# Patient Record
Sex: MALE | Race: WHITE | NOT HISPANIC OR LATINO
[De-identification: names, ages, dates, MRNs, and addresses within clinical notes are randomized per-mention and may not be internally consistent; named-entity substitution may affect disease eponyms.]

---

## 2019-02-26 ENCOUNTER — RX RENEWAL (OUTPATIENT)
Age: 59
End: 2019-02-26

## 2019-04-08 DIAGNOSIS — Z80.8 FAMILY HISTORY OF MALIGNANT NEOPLASM OF OTHER ORGANS OR SYSTEMS: ICD-10-CM

## 2019-04-08 DIAGNOSIS — Z83.438 FAMILY HISTORY OF OTHER DISORDER OF LIPOPROTEIN METABOLISM AND OTHER LIPIDEMIA: ICD-10-CM

## 2019-04-15 ENCOUNTER — RX RENEWAL (OUTPATIENT)
Age: 59
End: 2019-04-15

## 2019-04-19 ENCOUNTER — RX RENEWAL (OUTPATIENT)
Age: 59
End: 2019-04-19

## 2019-04-22 ENCOUNTER — RX RENEWAL (OUTPATIENT)
Age: 59
End: 2019-04-22

## 2019-04-24 ENCOUNTER — RX RENEWAL (OUTPATIENT)
Age: 59
End: 2019-04-24

## 2019-04-29 ENCOUNTER — APPOINTMENT (OUTPATIENT)
Dept: INTERNAL MEDICINE | Facility: CLINIC | Age: 59
End: 2019-04-29
Payer: COMMERCIAL

## 2019-04-29 VITALS
OXYGEN SATURATION: 98 % | WEIGHT: 263 LBS | SYSTOLIC BLOOD PRESSURE: 140 MMHG | HEART RATE: 64 BPM | RESPIRATION RATE: 16 BRPM | DIASTOLIC BLOOD PRESSURE: 88 MMHG | BODY MASS INDEX: 31.05 KG/M2 | TEMPERATURE: 98.4 F | HEIGHT: 77 IN

## 2019-04-29 DIAGNOSIS — Z84.1 FAMILY HISTORY OF DISORDERS OF KIDNEY AND URETER: ICD-10-CM

## 2019-04-29 DIAGNOSIS — Z78.9 OTHER SPECIFIED HEALTH STATUS: ICD-10-CM

## 2019-04-29 DIAGNOSIS — Z87.891 PERSONAL HISTORY OF NICOTINE DEPENDENCE: ICD-10-CM

## 2019-04-29 LAB
BILIRUB UR QL STRIP: NEGATIVE
CLARITY UR: CLEAR
COLLECTION METHOD: NORMAL
GLUCOSE UR-MCNC: 500
HCG UR QL: 0.2 EU/DL
HGB UR QL STRIP.AUTO: NEGATIVE
KETONES UR-MCNC: NEGATIVE
LEUKOCYTE ESTERASE UR QL STRIP: NEGATIVE
NITRITE UR QL STRIP: NEGATIVE
PH UR STRIP: 5.5
PROT UR STRIP-MCNC: NEGATIVE
SP GR UR STRIP: 1.02

## 2019-04-29 PROCEDURE — 81003 URINALYSIS AUTO W/O SCOPE: CPT | Mod: QW

## 2019-04-29 PROCEDURE — 36415 COLL VENOUS BLD VENIPUNCTURE: CPT

## 2019-04-29 PROCEDURE — 99396 PREV VISIT EST AGE 40-64: CPT | Mod: 25

## 2019-04-29 PROCEDURE — 86580 TB INTRADERMAL TEST: CPT

## 2019-04-29 NOTE — DATA REVIEWED
[FreeTextEntry1] : Patient refused EKG, aorta skin, pulmonary function tests, and vaso scan \par Refused Hep c and HIV testing.  \par Refused rectal and genital exam

## 2019-04-29 NOTE — PHYSICAL EXAM
[No Acute Distress] : no acute distress [Well Nourished] : well nourished [Well Developed] : well developed [Well-Appearing] : well-appearing [Normal Sclera/Conjunctiva] : normal sclera/conjunctiva [PERRL] : pupils equal round and reactive to light [EOMI] : extraocular movements intact [Normal Outer Ear/Nose] : the outer ears and nose were normal in appearance [Normal Oropharynx] : the oropharynx was normal [Normal TMs] : both tympanic membranes were normal [Normal Nasal Mucosa] : the nasal mucosa was normal [No JVD] : no jugular venous distention [Supple] : supple [No Lymphadenopathy] : no lymphadenopathy [Thyroid Normal, No Nodules] : the thyroid was normal and there were no nodules present [No Respiratory Distress] : no respiratory distress  [Clear to Auscultation] : lungs were clear to auscultation bilaterally [No Accessory Muscle Use] : no accessory muscle use [Normal Percussion] : the chest was normal to percussion [Normal Rate] : normal rate  [Regular Rhythm] : with a regular rhythm [Normal S1, S2] : normal S1 and S2 [No Carotid Bruits] : no carotid bruits [No Abdominal Bruit] : a ~M bruit was not heard ~T in the abdomen [No Varicosities] : no varicosities [Pedal Pulses Present] : the pedal pulses are present [No Edema] : there was no peripheral edema [No Extremity Clubbing/Cyanosis] : no extremity clubbing/cyanosis [No Palpable Aorta] : no palpable aorta [Normal Appearance] : normal in appearance [Soft] : abdomen soft [Non Tender] : non-tender [Non-distended] : non-distended [No Masses] : no abdominal mass palpated [No HSM] : no HSM [Normal Bowel Sounds] : normal bowel sounds [Obese] : obese [Declined Rectal Exam] : declined rectal exam [Normal Posterior Cervical Nodes] : no posterior cervical lymphadenopathy [Normal Anterior Cervical Nodes] : no anterior cervical lymphadenopathy [No CVA Tenderness] : no CVA  tenderness [No Spinal Tenderness] : no spinal tenderness [No Joint Swelling] : no joint swelling [Grossly Normal Strength/Tone] : grossly normal strength/tone [No Rash] : no rash [Male Pattern Baldness] : male pattern baldness [Normal Gait] : normal gait [Coordination Grossly Intact] : coordination grossly intact [No Focal Deficits] : no focal deficits [Deep Tendon Reflexes (DTR)] : deep tendon reflexes were 2+ and symmetric [Speech Grossly Normal] : speech grossly normal [Normal Affect] : the affect was normal [Normal Mood] : the mood was normal [Normal Insight/Judgement] : insight and judgment were intact [None] : no ulcers in either foot were found [] : left foot [___] : right foot points [unfilled] [de-identified] : overweight  [de-identified] : declined PFT  [de-identified] : with murmur, Declined EKG  [de-identified] : declined exam  [de-identified] :  Discoloration of the lower extremity,  purplish discoloration of bilateral toes. Had abrasion at the tip of the first toe with callus. Right foot callus and fissure proximal to the fourth and fifth digit [de-identified] : cool to touch with purplish discoloration  [de-identified] :   rubar discoloration of bilateral toes. Had abrasion at the tip of the first toe with callus. Right foot callus and fissure proximal to the fourth and fifth digit

## 2019-04-29 NOTE — PLAN
[FreeTextEntry1] : Cardiology-\par Patient with metabolic syndrome. Refused EKG today. Advised the importance of checking for underlying cardiovascular disease. Advised patient to followup cardiology. Check labs today.\par \par Pulmonology\par Ex-smoker. Advised patient the importance of checking his pulmonary function test to rule out underlying pulmonary disease. Patient refused.\par Refused aorta skin to rule out underlying abdominal aortic aneurysm secondary to his history of smoking.\par Education was provided.\par \par Gastroenterology/urology.\par Patient refused genital and rectal exam. Advised importance to screen for colon, and prostate cancer. Advised patient to follow gastroenterologist and urologist.\par \par Endocrinology.\par Uncontrolled diabetes. New signs of diabetic neuropathy. Otherwise patient importance of following up with a podiatrist for complete evaluation. As well as to address the 2 foot lesions. Also advised patient importance followup with ophthalmology will see her to rule out diabetic retinopathy. Check A1c level today. Continue with current medications.\par \par Immunization.\par Implanted PPD right Forearm.  Ha Hx MMR titers 2016 labs.

## 2019-04-29 NOTE — HISTORY OF PRESENT ILLNESS
[FreeTextEntry1] : A 58-year-old male, who presents to the office for a complete physical examination [de-identified] : This is a 58-year-old male, with a past medical history as noted below, presents to the office for complete physical exam. Patient needs a physical exam for his job. Also needs to have a PPD implanted and paperwork filled out. [place] states he recently changed his diet. Has been losing weight since his last visit. States his blood glucose on average is 130. Denies having any hypo-or hyperglycemic events. Also states he had a colonoscopy within the last 3 years. Patient denies having chest pain, shortness of breath, dyspnea on exertion or nausea. States he takes his medication as directed.\par He states he never went for his varicose vein surgery.\par States he follows up with podiatrist in ophthalmology

## 2019-04-29 NOTE — HEALTH RISK ASSESSMENT
[Good] : ~his/her~  mood as  good [6-10] : 6-10 [One fall no injury in past year] : Patient reported one fall in the past year without injury [0] : 2) Feeling down, depressed, or hopeless: Not at all (0) [No Retinopathy] : No retinopathy [Patient reported colonoscopy was normal] : Patient reported colonoscopy was normal [HIV test declined] : HIV test declined [Hepatitis C test declined] : Hepatitis C test declined [None] : None [With Family] : lives with family [# of Members in Household ___] :  household currently consist of [unfilled] member(s) [Employed] : employed [College] : College [] :  [# Of Children ___] : has [unfilled] children [Sexually Active] : sexually active [Feels Safe at Home] : Feels safe at home [Fully functional (bathing, dressing, toileting, transferring, walking, feeding)] : Fully functional (bathing, dressing, toileting, transferring, walking, feeding) [Fully functional (using the telephone, shopping, preparing meals, housekeeping, doing laundry, using] : Fully functional and needs no help or supervision to perform IADLs (using the telephone, shopping, preparing meals, housekeeping, doing laundry, using transportation, managing medications and managing finances) [Smoke Detector] : smoke detector [Carbon Monoxide Detector] : carbon monoxide detector [Seat Belt] :  uses seat belt [] : No [YearQuit] : 1998 [de-identified] : social [de-identified] : Not often  [de-identified] : Low carbohydrate  [AHS6Hexlz] : 0 [EyeExamDate] : 11/17 [Change in mental status noted] : No change in mental status noted [Language] : denies difficulty with language [Behavior] : denies difficulty with behavior [Learning/Retaining New Information] : denies difficulty learning/retaining new information [Reasoning] : denies difficulty with reasoning [Spatial Ability and Orientation] : denies difficulty with spatial ability and orientation [High Risk Behavior] : no high risk behavior [Reports changes in hearing] : Reports no changes in hearing [Reports changes in vision] : Reports no changes in vision [Reports normal functional visual acuity (ie: able to read med bottle)] : Reports poor functional visual acuity.  [Reports changes in dental health] : Reports no changes in dental health [Guns at Home] : no guns at home [Sunscreen] : does not use sunscreen [Travel to Developing Areas] : does not  travel to developing areas [TB Exposure] : is not being exposed to tuberculosis [ColonoscopyDate] : 01/17 [AdvancecareDate] : 04/19

## 2019-04-29 NOTE — COUNSELING
[Weight management counseling provided] : Weight management [Healthy eating counseling provided] : healthy eating [Behavioral health counseling provided] : behavioral health  [Low Fat Diet] : Low fat diet [Low Salt Diet] : Low salt diet [Decrease Portions] : Decrease food portions [Walking] : Walking [Patient motivation] : Patient motivation [None] : None [Good understanding] : Patient has a good understanding of lifestyle changes and the steps needed to achieve self management goals [de-identified] : Advised to see Endo, Podiatrist and ophthalmologist \par 1800  ADA diet \par Increase exercise

## 2019-04-30 LAB
25(OH)D3 SERPL-MCNC: 56.1 NG/ML
ALBUMIN SERPL ELPH-MCNC: 5 G/DL
ALP BLD-CCNC: 72 U/L
ALT SERPL-CCNC: 28 U/L
ANION GAP SERPL CALC-SCNC: 17 MMOL/L
AST SERPL-CCNC: 17 U/L
BASOPHILS # BLD AUTO: 0.04 K/UL
BASOPHILS NFR BLD AUTO: 0.4 %
BILIRUB SERPL-MCNC: 0.6 MG/DL
BUN SERPL-MCNC: 19 MG/DL
CALCIUM SERPL-MCNC: 10.3 MG/DL
CHLORIDE SERPL-SCNC: 99 MMOL/L
CHOLEST SERPL-MCNC: 183 MG/DL
CHOLEST/HDLC SERPL: 4.2 RATIO
CO2 SERPL-SCNC: 26 MMOL/L
CREAT SERPL-MCNC: 0.95 MG/DL
CREAT SPEC-SCNC: 77 MG/DL
EOSINOPHIL # BLD AUTO: 0.4 K/UL
EOSINOPHIL NFR BLD AUTO: 3.8 %
ESTIMATED AVERAGE GLUCOSE: 177 MG/DL
GLUCOSE SERPL-MCNC: 147 MG/DL
HBA1C MFR BLD HPLC: 7.8 %
HCT VFR BLD CALC: 51.8 %
HDLC SERPL-MCNC: 44 MG/DL
HGB BLD-MCNC: 16.5 G/DL
IMM GRANULOCYTES NFR BLD AUTO: 0.4 %
LDLC SERPL CALC-MCNC: 105 MG/DL
LYMPHOCYTES # BLD AUTO: 3.83 K/UL
LYMPHOCYTES NFR BLD AUTO: 35.9 %
MAN DIFF?: NORMAL
MCHC RBC-ENTMCNC: 30.6 PG
MCHC RBC-ENTMCNC: 31.9 GM/DL
MCV RBC AUTO: 95.9 FL
MICROALBUMIN 24H UR DL<=1MG/L-MCNC: <1.2 MG/DL
MICROALBUMIN/CREAT 24H UR-RTO: NORMAL MG/G
MONOCYTES # BLD AUTO: 0.84 K/UL
MONOCYTES NFR BLD AUTO: 7.9 %
NEUTROPHILS # BLD AUTO: 5.51 K/UL
NEUTROPHILS NFR BLD AUTO: 51.6 %
PLATELET # BLD AUTO: 291 K/UL
POTASSIUM SERPL-SCNC: 5.2 MMOL/L
PROT SERPL-MCNC: 7.5 G/DL
PSA FREE FLD-MCNC: 27 %
PSA FREE SERPL-MCNC: 0.21 NG/ML
PSA SERPL-MCNC: 0.8 NG/ML
RBC # BLD: 5.4 M/UL
RBC # FLD: 12.5 %
SODIUM SERPL-SCNC: 142 MMOL/L
TRIGL SERPL-MCNC: 172 MG/DL
TSH SERPL-ACNC: 2.45 UIU/ML
WBC # FLD AUTO: 10.66 K/UL

## 2019-05-16 ENCOUNTER — RX RENEWAL (OUTPATIENT)
Age: 59
End: 2019-05-16

## 2019-05-20 ENCOUNTER — RX RENEWAL (OUTPATIENT)
Age: 59
End: 2019-05-20

## 2019-05-20 ENCOUNTER — MED ADMIN CHARGE (OUTPATIENT)
Age: 59
End: 2019-05-20

## 2019-05-21 ENCOUNTER — RX RENEWAL (OUTPATIENT)
Age: 59
End: 2019-05-21

## 2019-06-17 ENCOUNTER — RX RENEWAL (OUTPATIENT)
Age: 59
End: 2019-06-17

## 2019-08-14 ENCOUNTER — LABORATORY RESULT (OUTPATIENT)
Age: 59
End: 2019-08-14

## 2019-08-14 ENCOUNTER — APPOINTMENT (OUTPATIENT)
Dept: INTERNAL MEDICINE | Facility: CLINIC | Age: 59
End: 2019-08-14
Payer: COMMERCIAL

## 2019-08-14 VITALS
HEIGHT: 77 IN | SYSTOLIC BLOOD PRESSURE: 144 MMHG | DIASTOLIC BLOOD PRESSURE: 60 MMHG | TEMPERATURE: 98.1 F | OXYGEN SATURATION: 98 % | HEART RATE: 82 BPM | RESPIRATION RATE: 16 BRPM | WEIGHT: 262.38 LBS | BODY MASS INDEX: 30.98 KG/M2

## 2019-08-14 PROCEDURE — 36415 COLL VENOUS BLD VENIPUNCTURE: CPT

## 2019-08-14 PROCEDURE — 99214 OFFICE O/P EST MOD 30 MIN: CPT | Mod: 25

## 2019-08-14 NOTE — PLAN
[FreeTextEntry1] : Ophthalmology\par Results of annual diabetic eye exam to be requested from ophthalmologist, Dr. Rodriguez \par Cardiology\par hypertension - continue Lisinopril 10mg p.o.q.d., Rx filled - continue low sodium diet\par continue Aspirin 81mg p.o.q.d.\par Endocrinology\par diabetes - continue Janumet 50-1000mg BID p.o.q.d. with meals, Rx filled and Jardiance 25mg p.o.q.d., Rx filled - continue low carbohydrate/low sugar diet - check hemoglobin A1C and urine microalbumin/creatinine ratio\par hyperlipidemia - continue Simvastatin 40mg p.o.q.d., Rx filled - continue low cholesterol/low fat diet - check FLP and LFTs\par vitamin D deficiency - check vitamin D\par obesity - continue low carbohydrate diet and CV exercise\par \par check male panel, UA

## 2019-08-14 NOTE — ASSESSMENT
[FreeTextEntry1] : Patient is a 58 year old male with a past medical history as above who presents for fasting blood work, Rx refill, and general follow-up.\par

## 2019-08-14 NOTE — ADDENDUM
[FreeTextEntry1] : I, Eric Pandya, acted solely as scribe for Dr. Aidan Julio DO on this date 08/14/2019 10:50AM .\par \par All medical record entries made by the Scribe were at my, Dr. Aidan Julio DO direction and personally dictated by me on 08/14/2019 10:50AM. I have reviewed the chart and agree that the record accurately reflects my personal performance of the history, physical exam, assessment and plan. I have also personally directed, reviewed and agreed with the chart.\par

## 2019-08-14 NOTE — HISTORY OF PRESENT ILLNESS
[FreeTextEntry1] : fasting blood work, Rx refill, and general follow-up\par  [de-identified] : Patient is a 58 year old male with a past medical history as below who presents for fasting blood work, Rx refill, and general follow-up. Patient states he is taking all medications as prescribed and denies any adverse reactions or side effects. Patient states he will be seeing ophthalmologist, Dr. Rodriguez today for his annual diabetic eye exam. He notes weight loss which he attributes to maintaining a well-balanced diet. Patient states he has been trying to exercise regularly (treadmill/bike). He notes self-recorded blood-glucose levels have ranged from 120 to 150. Patient requests Rx refill for Janumet, Jardiance, Simvastatin, and Lisinopril.

## 2019-08-14 NOTE — PHYSICAL EXAM
[No Acute Distress] : no acute distress [Well Nourished] : well nourished [Well Developed] : well developed [Well-Appearing] : well-appearing [Normal Sclera/Conjunctiva] : normal sclera/conjunctiva [PERRL] : pupils equal round and reactive to light [EOMI] : extraocular movements intact [Normal Outer Ear/Nose] : the outer ears and nose were normal in appearance [Normal Oropharynx] : the oropharynx was normal [No JVD] : no jugular venous distention [No Lymphadenopathy] : no lymphadenopathy [Supple] : supple [Thyroid Normal, No Nodules] : the thyroid was normal and there were no nodules present [No Respiratory Distress] : no respiratory distress  [No Accessory Muscle Use] : no accessory muscle use [Clear to Auscultation] : lungs were clear to auscultation bilaterally [Normal Rate] : normal rate  [Regular Rhythm] : with a regular rhythm [Normal S1, S2] : normal S1 and S2 [No Carotid Bruits] : no carotid bruits [No Murmur] : no murmur heard [No Varicosities] : no varicosities [No Abdominal Bruit] : a ~M bruit was not heard ~T in the abdomen [No Edema] : there was no peripheral edema [Pedal Pulses Present] : the pedal pulses are present [No Palpable Aorta] : no palpable aorta [No Extremity Clubbing/Cyanosis] : no extremity clubbing/cyanosis [Soft] : abdomen soft [Non-distended] : non-distended [Non Tender] : non-tender [No Masses] : no abdominal mass palpated [Normal Bowel Sounds] : normal bowel sounds [No HSM] : no HSM [Normal Posterior Cervical Nodes] : no posterior cervical lymphadenopathy [Normal Anterior Cervical Nodes] : no anterior cervical lymphadenopathy [No CVA Tenderness] : no CVA  tenderness [No Spinal Tenderness] : no spinal tenderness [No Joint Swelling] : no joint swelling [Grossly Normal Strength/Tone] : grossly normal strength/tone [No Rash] : no rash [Coordination Grossly Intact] : coordination grossly intact [No Focal Deficits] : no focal deficits [Normal Gait] : normal gait [Deep Tendon Reflexes (DTR)] : deep tendon reflexes were 2+ and symmetric [Normal Affect] : the affect was normal [Normal Insight/Judgement] : insight and judgment were intact [de-identified] : obese

## 2019-08-27 LAB
25(OH)D3 SERPL-MCNC: 60.5 NG/ML
ALBUMIN SERPL ELPH-MCNC: 4.5 G/DL
ALP BLD-CCNC: 69 U/L
ALT SERPL-CCNC: 29 U/L
ANION GAP SERPL CALC-SCNC: 16 MMOL/L
AST SERPL-CCNC: 17 U/L
BASOPHILS # BLD AUTO: 0 K/UL
BASOPHILS NFR BLD AUTO: 0 %
BILIRUB SERPL-MCNC: 0.9 MG/DL
BUN SERPL-MCNC: 15 MG/DL
CALCIUM SERPL-MCNC: 9.6 MG/DL
CHLORIDE SERPL-SCNC: 99 MMOL/L
CHOLEST SERPL-MCNC: 117 MG/DL
CHOLEST/HDLC SERPL: 3.6 RATIO
CO2 SERPL-SCNC: 22 MMOL/L
CREAT SERPL-MCNC: 0.91 MG/DL
CREAT SPEC-SCNC: 108 MG/DL
EOSINOPHIL # BLD AUTO: 0.69 K/UL
EOSINOPHIL NFR BLD AUTO: 7.6 %
ESTIMATED AVERAGE GLUCOSE: 180 MG/DL
GLUCOSE SERPL-MCNC: 153 MG/DL
HBA1C MFR BLD HPLC: 7.9 %
HCT VFR BLD CALC: 49.7 %
HDLC SERPL-MCNC: 33 MG/DL
HGB BLD-MCNC: 15.6 G/DL
LDLC SERPL CALC-MCNC: 55 MG/DL
LYMPHOCYTES # BLD AUTO: 2.46 K/UL
LYMPHOCYTES NFR BLD AUTO: 27.1 %
MAN DIFF?: NORMAL
MCHC RBC-ENTMCNC: 30.1 PG
MCHC RBC-ENTMCNC: 31.4 GM/DL
MCV RBC AUTO: 95.9 FL
MICROALBUMIN 24H UR DL<=1MG/L-MCNC: <1.2 MG/DL
MICROALBUMIN/CREAT 24H UR-RTO: NORMAL MG/G
MONOCYTES # BLD AUTO: 1.08 K/UL
MONOCYTES NFR BLD AUTO: 11.9 %
NEUTROPHILS # BLD AUTO: 4.84 K/UL
NEUTROPHILS NFR BLD AUTO: 45.8 %
PLATELET # BLD AUTO: 289 K/UL
POTASSIUM SERPL-SCNC: 4.6 MMOL/L
PROT SERPL-MCNC: 6.9 G/DL
PSA SERPL-MCNC: 1.3 NG/ML
RBC # BLD: 5.18 M/UL
RBC # FLD: 12.4 %
SODIUM SERPL-SCNC: 137 MMOL/L
TRIGL SERPL-MCNC: 146 MG/DL
TSH SERPL-ACNC: 2.97 UIU/ML
WBC # FLD AUTO: 9.06 K/UL

## 2019-11-05 ENCOUNTER — RX RENEWAL (OUTPATIENT)
Age: 59
End: 2019-11-05

## 2019-11-13 ENCOUNTER — RX RENEWAL (OUTPATIENT)
Age: 59
End: 2019-11-13

## 2019-11-26 ENCOUNTER — APPOINTMENT (OUTPATIENT)
Dept: INTERNAL MEDICINE | Facility: CLINIC | Age: 59
End: 2019-11-26
Payer: COMMERCIAL

## 2019-11-26 VITALS
SYSTOLIC BLOOD PRESSURE: 118 MMHG | HEART RATE: 65 BPM | BODY MASS INDEX: 30.7 KG/M2 | OXYGEN SATURATION: 97 % | RESPIRATION RATE: 16 BRPM | HEIGHT: 77 IN | TEMPERATURE: 98.4 F | DIASTOLIC BLOOD PRESSURE: 74 MMHG | WEIGHT: 260 LBS

## 2019-11-26 PROCEDURE — 36415 COLL VENOUS BLD VENIPUNCTURE: CPT

## 2019-11-26 PROCEDURE — 99214 OFFICE O/P EST MOD 30 MIN: CPT | Mod: 25

## 2019-11-26 NOTE — PHYSICAL EXAM
[No Acute Distress] : no acute distress [Well Nourished] : well nourished [Well Developed] : well developed [Well-Appearing] : well-appearing [Normal Sclera/Conjunctiva] : normal sclera/conjunctiva [PERRL] : pupils equal round and reactive to light [EOMI] : extraocular movements intact [Normal Outer Ear/Nose] : the outer ears and nose were normal in appearance [Normal Oropharynx] : the oropharynx was normal [No JVD] : no jugular venous distention [No Lymphadenopathy] : no lymphadenopathy [Supple] : supple [Thyroid Normal, No Nodules] : the thyroid was normal and there were no nodules present [No Respiratory Distress] : no respiratory distress  [No Accessory Muscle Use] : no accessory muscle use [Clear to Auscultation] : lungs were clear to auscultation bilaterally [Regular Rhythm] : with a regular rhythm [Normal S1, S2] : normal S1 and S2 [Normal Rate] : normal rate  [No Carotid Bruits] : no carotid bruits [No Murmur] : no murmur heard [No Abdominal Bruit] : a ~M bruit was not heard ~T in the abdomen [No Varicosities] : no varicosities [Pedal Pulses Present] : the pedal pulses are present [No Edema] : there was no peripheral edema [No Extremity Clubbing/Cyanosis] : no extremity clubbing/cyanosis [No Palpable Aorta] : no palpable aorta [Soft] : abdomen soft [Non Tender] : non-tender [Non-distended] : non-distended [No Masses] : no abdominal mass palpated [Normal Posterior Cervical Nodes] : no posterior cervical lymphadenopathy [Normal Bowel Sounds] : normal bowel sounds [No HSM] : no HSM [Normal Anterior Cervical Nodes] : no anterior cervical lymphadenopathy [No CVA Tenderness] : no CVA  tenderness [No Joint Swelling] : no joint swelling [No Spinal Tenderness] : no spinal tenderness [Grossly Normal Strength/Tone] : grossly normal strength/tone [No Rash] : no rash [No Focal Deficits] : no focal deficits [Coordination Grossly Intact] : coordination grossly intact [Normal Gait] : normal gait [Deep Tendon Reflexes (DTR)] : deep tendon reflexes were 2+ and symmetric [Normal Affect] : the affect was normal [Normal Insight/Judgement] : insight and judgment were intact [de-identified] : obese

## 2019-11-26 NOTE — HISTORY OF PRESENT ILLNESS
[FreeTextEntry1] : fasting blood work and general follow-up\par  [de-identified] : Patient is a 58 year old male with a past medical history as below who presents for fasting blood work and general follow-up. He denies lightheadedness or dizziness on Lisinopril. He denies any new arthralgias or myalgias on Simvastatin. He denies any recent episodes of hypoglycemia. Patient states last diabetic eye exam was with ophthalmologist, Dr. Rodriguez. He states he has been trying to maintain a well-balanced, low carbohydrate diet. Patient states he received the flu vaccine recently.

## 2019-11-26 NOTE — ADDENDUM
[FreeTextEntry1] : I, Eric Pandya, acted solely as scribe for Dr. Aidan Julio DO on this date 11/26/2019 12:30PM .\par \par All medical record entries made by the Scribe were at my, Dr. Aidan Julio DO direction and personally dictated by me on 11/26/2019 12:30PM. I have reviewed the chart and agree that the record accurately reflects my personal performance of the history, physical exam, assessment and plan. I have also personally directed, reviewed and agreed with the chart.\par

## 2019-11-26 NOTE — PLAN
[FreeTextEntry1] : Ophthalmology\par Results of annual diabetic eye exam to be requested from ophthalmologist, Dr. Rodriguez \par Cardiology\par essential hypertension - continue Lisinopril 10mg p.o.q.d. - continue low sodium diet and weight loss \par Endocrinology\par diabetes - continue Janumet 50-1000mg BID p.o.q.d. with meals and Jardiance 25mg p.o.q.d. - continue low carbohydrate/low sugar diet - check hemoglobin A1C and urine microalbumin/creatinine ratio\par hyperlipidemia - continue Simvastatin 40mg p.o.q.d. - continue low cholesterol/low fat diet - check FLP and LFTs\par vitamin D deficiency - check vitamin D\par obesity - continue low carbohydrate diet and CV exercise\par Infectious Disease\par flu vaccine - evidence of vaccine administration to be requested \par \par check male panel and UA \par

## 2019-12-02 LAB
25(OH)D3 SERPL-MCNC: 67.2 NG/ML
ALBUMIN SERPL ELPH-MCNC: 5 G/DL
ALP BLD-CCNC: 73 U/L
ALT SERPL-CCNC: 29 U/L
ANION GAP SERPL CALC-SCNC: 16 MMOL/L
AST SERPL-CCNC: 17 U/L
BASOPHILS # BLD AUTO: 0.06 K/UL
BASOPHILS NFR BLD AUTO: 0.6 %
BILIRUB SERPL-MCNC: 0.5 MG/DL
BUN SERPL-MCNC: 19 MG/DL
CALCIUM SERPL-MCNC: 10.5 MG/DL
CHLORIDE SERPL-SCNC: 101 MMOL/L
CHOLEST SERPL-MCNC: 176 MG/DL
CHOLEST/HDLC SERPL: 4.2 RATIO
CO2 SERPL-SCNC: 24 MMOL/L
CREAT SERPL-MCNC: 0.9 MG/DL
CREAT SPEC-SCNC: 103 MG/DL
EOSINOPHIL # BLD AUTO: 0.41 K/UL
EOSINOPHIL NFR BLD AUTO: 4.1 %
ESTIMATED AVERAGE GLUCOSE: 189 MG/DL
GLUCOSE SERPL-MCNC: 166 MG/DL
HBA1C MFR BLD HPLC: 8.2 %
HCT VFR BLD CALC: 51.6 %
HDLC SERPL-MCNC: 42 MG/DL
HGB BLD-MCNC: 16.4 G/DL
IMM GRANULOCYTES NFR BLD AUTO: 0.4 %
LDLC SERPL CALC-MCNC: 99 MG/DL
LYMPHOCYTES # BLD AUTO: 3.4 K/UL
LYMPHOCYTES NFR BLD AUTO: 33.8 %
MAN DIFF?: NORMAL
MCHC RBC-ENTMCNC: 30.7 PG
MCHC RBC-ENTMCNC: 31.8 GM/DL
MCV RBC AUTO: 96.4 FL
MICROALBUMIN 24H UR DL<=1MG/L-MCNC: <1.2 MG/DL
MICROALBUMIN/CREAT 24H UR-RTO: NORMAL MG/G
MONOCYTES # BLD AUTO: 0.78 K/UL
MONOCYTES NFR BLD AUTO: 7.7 %
NEUTROPHILS # BLD AUTO: 5.38 K/UL
NEUTROPHILS NFR BLD AUTO: 53.4 %
PLATELET # BLD AUTO: 314 K/UL
POTASSIUM SERPL-SCNC: 4.9 MMOL/L
PROT SERPL-MCNC: 7.5 G/DL
PSA SERPL-MCNC: 0.68 NG/ML
RBC # BLD: 5.35 M/UL
RBC # FLD: 12.5 %
SODIUM SERPL-SCNC: 140 MMOL/L
TRIGL SERPL-MCNC: 175 MG/DL
TSH SERPL-ACNC: 2.32 UIU/ML
WBC # FLD AUTO: 10.07 K/UL

## 2019-12-16 ENCOUNTER — RX RENEWAL (OUTPATIENT)
Age: 59
End: 2019-12-16

## 2020-02-04 ENCOUNTER — RX RENEWAL (OUTPATIENT)
Age: 60
End: 2020-02-04

## 2020-03-04 ENCOUNTER — RX RENEWAL (OUTPATIENT)
Age: 60
End: 2020-03-04

## 2020-03-26 ENCOUNTER — RX RENEWAL (OUTPATIENT)
Age: 60
End: 2020-03-26

## 2020-03-30 ENCOUNTER — NON-APPOINTMENT (OUTPATIENT)
Age: 60
End: 2020-03-30

## 2020-03-30 DIAGNOSIS — M79.10 MYALGIA, UNSPECIFIED SITE: ICD-10-CM

## 2020-04-03 ENCOUNTER — RX RENEWAL (OUTPATIENT)
Age: 60
End: 2020-04-03

## 2020-04-03 RX ORDER — BLOOD SUGAR DIAGNOSTIC
STRIP MISCELLANEOUS
Qty: 180 | Refills: 3 | Status: ACTIVE | COMMUNITY
Start: 2019-11-08 | End: 1900-01-01

## 2020-04-07 ENCOUNTER — RX RENEWAL (OUTPATIENT)
Age: 60
End: 2020-04-07

## 2020-05-15 ENCOUNTER — RX RENEWAL (OUTPATIENT)
Age: 60
End: 2020-05-15

## 2020-05-19 ENCOUNTER — APPOINTMENT (OUTPATIENT)
Dept: INTERNAL MEDICINE | Facility: CLINIC | Age: 60
End: 2020-05-19
Payer: COMMERCIAL

## 2020-05-19 VITALS
WEIGHT: 260 LBS | TEMPERATURE: 98.6 F | OXYGEN SATURATION: 96 % | HEART RATE: 64 BPM | DIASTOLIC BLOOD PRESSURE: 68 MMHG | BODY MASS INDEX: 30.7 KG/M2 | RESPIRATION RATE: 16 BRPM | HEIGHT: 77 IN | SYSTOLIC BLOOD PRESSURE: 108 MMHG

## 2020-05-19 PROCEDURE — 36415 COLL VENOUS BLD VENIPUNCTURE: CPT

## 2020-05-19 PROCEDURE — 99214 OFFICE O/P EST MOD 30 MIN: CPT | Mod: 25

## 2020-05-21 NOTE — PLAN
[FreeTextEntry1] : Endocrinology\par DM2-continue jardiance and janumet-check a1c, urine for microalbumin to creatinine ratio, yearly opthalmology visit\par \par hyperlipidemia-continue simvastatin 40 mg qd, check FLP and LFTs\par \par Cardiology\par HTN-stable on lisinopril 10 mg qd, low sodium diet\par \par Infectious Diseases\par suspected Covid 19 infection-check Covid antibodies

## 2020-05-21 NOTE — HISTORY OF PRESENT ILLNESS
[FreeTextEntry1] : fasting blood work, general follow up [de-identified] : 59 year old male PMH as below presents for fasting blood work and general follow up.  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  He was out sick at the end of march/beginning of april with suspected Covid 19 infection.  He is now completely better and without resultant sequelae.  His weight is stable at 260 pounds.  He is taking all medications as prescribed.  He denies any episodes of hypoglycemia on janumet and jardiance.  He denies myalgias/arthralgias on simvastatin.  He denies dizziness/lightheadedness on lisinopril.

## 2020-05-21 NOTE — ASSESSMENT
[FreeTextEntry1] : 59 year old male presents for fasting blood work, RX refills and general follow up.

## 2020-05-26 LAB
25(OH)D3 SERPL-MCNC: 67.9 NG/ML
ALBUMIN SERPL ELPH-MCNC: 5 G/DL
ALP BLD-CCNC: 69 U/L
ALT SERPL-CCNC: 32 U/L
ANION GAP SERPL CALC-SCNC: 16 MMOL/L
AST SERPL-CCNC: 19 U/L
BASOPHILS # BLD AUTO: 0.05 K/UL
BASOPHILS NFR BLD AUTO: 0.5 %
BILIRUB SERPL-MCNC: 0.6 MG/DL
BUN SERPL-MCNC: 18 MG/DL
CALCIUM SERPL-MCNC: 9.9 MG/DL
CHLORIDE SERPL-SCNC: 102 MMOL/L
CHOLEST SERPL-MCNC: 161 MG/DL
CHOLEST/HDLC SERPL: 3.9 RATIO
CO2 SERPL-SCNC: 23 MMOL/L
CREAT SERPL-MCNC: 0.78 MG/DL
CREAT SPEC-SCNC: 90 MG/DL
EOSINOPHIL # BLD AUTO: 0.47 K/UL
EOSINOPHIL NFR BLD AUTO: 5.1 %
ESTIMATED AVERAGE GLUCOSE: 189 MG/DL
GLUCOSE SERPL-MCNC: 159 MG/DL
HBA1C MFR BLD HPLC: 8.2 %
HCT VFR BLD CALC: 50.3 %
HDLC SERPL-MCNC: 41 MG/DL
HGB BLD-MCNC: 15.9 G/DL
IMM GRANULOCYTES NFR BLD AUTO: 0.3 %
LDLC SERPL CALC-MCNC: 82 MG/DL
LYMPHOCYTES # BLD AUTO: 3.15 K/UL
LYMPHOCYTES NFR BLD AUTO: 33.9 %
MAN DIFF?: NORMAL
MCHC RBC-ENTMCNC: 30.8 PG
MCHC RBC-ENTMCNC: 31.6 GM/DL
MCV RBC AUTO: 97.3 FL
MICROALBUMIN 24H UR DL<=1MG/L-MCNC: <1.2 MG/DL
MICROALBUMIN/CREAT 24H UR-RTO: NORMAL MG/G
MONOCYTES # BLD AUTO: 0.76 K/UL
MONOCYTES NFR BLD AUTO: 8.2 %
NEUTROPHILS # BLD AUTO: 4.84 K/UL
NEUTROPHILS NFR BLD AUTO: 52 %
PLATELET # BLD AUTO: 265 K/UL
POTASSIUM SERPL-SCNC: 4.7 MMOL/L
PROT SERPL-MCNC: 6.9 G/DL
PSA SERPL-MCNC: 0.62 NG/ML
RBC # BLD: 5.17 M/UL
RBC # FLD: 12.4 %
SODIUM SERPL-SCNC: 141 MMOL/L
TRIGL SERPL-MCNC: 187 MG/DL
TSH SERPL-ACNC: 2.23 UIU/ML
WBC # FLD AUTO: 9.3 K/UL

## 2020-06-09 ENCOUNTER — NON-APPOINTMENT (OUTPATIENT)
Age: 60
End: 2020-06-09

## 2020-06-09 ENCOUNTER — APPOINTMENT (OUTPATIENT)
Dept: INTERNAL MEDICINE | Facility: CLINIC | Age: 60
End: 2020-06-09
Payer: COMMERCIAL

## 2020-06-09 ENCOUNTER — RX RENEWAL (OUTPATIENT)
Age: 60
End: 2020-06-09

## 2020-06-09 VITALS
DIASTOLIC BLOOD PRESSURE: 62 MMHG | HEIGHT: 77 IN | TEMPERATURE: 99.5 F | WEIGHT: 260 LBS | SYSTOLIC BLOOD PRESSURE: 102 MMHG | BODY MASS INDEX: 30.7 KG/M2 | HEART RATE: 59 BPM | RESPIRATION RATE: 16 BRPM | OXYGEN SATURATION: 97 %

## 2020-06-09 DIAGNOSIS — Z87.891 PERSONAL HISTORY OF NICOTINE DEPENDENCE: ICD-10-CM

## 2020-06-09 DIAGNOSIS — R94.31 ABNORMAL ELECTROCARDIOGRAM [ECG] [EKG]: ICD-10-CM

## 2020-06-09 PROCEDURE — 93000 ELECTROCARDIOGRAM COMPLETE: CPT

## 2020-06-09 PROCEDURE — 99396 PREV VISIT EST AGE 40-64: CPT | Mod: 25

## 2020-06-09 PROCEDURE — 36415 COLL VENOUS BLD VENIPUNCTURE: CPT

## 2020-06-09 PROCEDURE — 86580 TB INTRADERMAL TEST: CPT

## 2020-06-09 RX ORDER — ASPIRIN 81 MG
81 TABLET, DELAYED RELEASE (ENTERIC COATED) ORAL DAILY
Refills: 5 | Status: ACTIVE | COMMUNITY

## 2020-06-09 NOTE — PLAN
[FreeTextEntry1] : Infectious Disease\par PPD tuberculin skin test - 0.1mL x 1 administered intradermally - to be read by physician at [facility] \par check MMR titer, Varicella Zoster titer, Hepatitis A IgG antibody, and Hepatitis B Surface antibody \par Ophthalmology\par Continue to follow up with ophthalmologist, Dr. Rodriguez annually for diabetic eye exam\par Pulmonary\par former smoker - check EKG (results as above) - continue smoking cessation \par Cardiology\par essential hypertension - check EKG (results as above) - continue Lisinopril 10mg p.o.q.d. - continue low sodium diet and weight loss \par Referred to cardiologist, Dr. Harding for consultation given abnormal EKG and Hx of DM\par continue Aspirin 81mg p.o.q.d. \par Endocrinology\par diabetes - continue Janumet 50-1000mg BID p.o.q.d. with meals, Jardiance 25mg p.o.q.d., and Glipizide ER 2.5mg p.o.q.d. as directed; continue Lisinopril 10mg p.o.q.d. - continue low carbohydrate/low sugar diet \par hyperlipidemia - continue Simvastatin 40mg p.o.q.d. - continue low cholesterol/low fat diet \par obesity - continue low carbohydrate diet and CV exercise\par \par check EKG (results as above), MMR titer, Varicella Zoster titer, Hepatitis A IgG antibody, and Hepatitis B Surface antibody \par Medical forms to be completed per patient's request.

## 2020-06-09 NOTE — ADDENDUM
[FreeTextEntry1] : I, Eric Pandya, acted solely as scribe for Dr. Aidan Julio DO on this date 06/09/2020  1:20PM .\par \par All medical record entries made by the Scribe were at my, Dr. Aidan Julio DO direction and personally dictated by me on 06/09/2020  1:20PM. I have reviewed the chart and agree that the record accurately reflects my personal performance of the history, physical exam, assessment and plan. I have also personally directed, reviewed and agreed with the chart.\par

## 2020-06-09 NOTE — ASSESSMENT
[FreeTextEntry1] : Patient is a 59 year old male with a past medical history as above who presents for an annual wellness visit.

## 2020-06-09 NOTE — HISTORY OF PRESENT ILLNESS
[FreeTextEntry1] : annual wellness visit  [de-identified] : Patient is a 59 year old male with a past medical history as below who presents for an annual wellness visit. Patient states he is taking all medications as prescribed and denies any adverse reactions or side effects. He denies lightheadedness or dizziness on Lisinopril. He denies any new arthralgias or myalgias on Simvastatin. He denies any recent episodes of hypoglycemia. Patient received the flu vaccine at [facility]. Patient requests medical forms be completed for work.

## 2020-06-09 NOTE — PHYSICAL EXAM
[No Acute Distress] : no acute distress [Well Nourished] : well nourished [Well-Appearing] : well-appearing [Well Developed] : well developed [Normal Sclera/Conjunctiva] : normal sclera/conjunctiva [PERRL] : pupils equal round and reactive to light [EOMI] : extraocular movements intact [Normal Oropharynx] : the oropharynx was normal [Normal Outer Ear/Nose] : the outer ears and nose were normal in appearance [No Lymphadenopathy] : no lymphadenopathy [Supple] : supple [No JVD] : no jugular venous distention [Thyroid Normal, No Nodules] : the thyroid was normal and there were no nodules present [No Respiratory Distress] : no respiratory distress  [Normal Rate] : normal rate  [Clear to Auscultation] : lungs were clear to auscultation bilaterally [No Accessory Muscle Use] : no accessory muscle use [Normal S1, S2] : normal S1 and S2 [Regular Rhythm] : with a regular rhythm [No Murmur] : no murmur heard [No Carotid Bruits] : no carotid bruits [No Abdominal Bruit] : a ~M bruit was not heard ~T in the abdomen [Pedal Pulses Present] : the pedal pulses are present [No Varicosities] : no varicosities [No Edema] : there was no peripheral edema [No Palpable Aorta] : no palpable aorta [Soft] : abdomen soft [No Extremity Clubbing/Cyanosis] : no extremity clubbing/cyanosis [Non Tender] : non-tender [Non-distended] : non-distended [No Masses] : no abdominal mass palpated [No HSM] : no HSM [Normal Bowel Sounds] : normal bowel sounds [Normal Anterior Cervical Nodes] : no anterior cervical lymphadenopathy [Normal Posterior Cervical Nodes] : no posterior cervical lymphadenopathy [No Spinal Tenderness] : no spinal tenderness [No CVA Tenderness] : no CVA  tenderness [No Joint Swelling] : no joint swelling [Grossly Normal Strength/Tone] : grossly normal strength/tone [No Rash] : no rash [Coordination Grossly Intact] : coordination grossly intact [No Focal Deficits] : no focal deficits [Normal Gait] : normal gait [Deep Tendon Reflexes (DTR)] : deep tendon reflexes were 2+ and symmetric [Normal Insight/Judgement] : insight and judgment were intact [Normal Affect] : the affect was normal [de-identified] : obese

## 2020-06-10 LAB
HBV SURFACE AB SER QL: NONREACTIVE
HEPATITIS A IGG ANTIBODY: NONREACTIVE
MEV IGG FLD QL IA: 132 AU/ML
MEV IGG+IGM SER-IMP: POSITIVE
MUV AB SER-ACNC: POSITIVE
MUV IGG SER QL IA: 63.2 AU/ML
RUBV IGG FLD-ACNC: 7 INDEX
RUBV IGG SER-IMP: POSITIVE
VZV AB TITR SER: POSITIVE
VZV IGG SER IF-ACNC: 971 INDEX

## 2020-06-11 LAB
RUBV IGM FLD-ACNC: <20 AU/ML
VZV IGM SER IF-ACNC: <0.91 INDEX

## 2020-06-12 ENCOUNTER — RX RENEWAL (OUTPATIENT)
Age: 60
End: 2020-06-12

## 2020-06-12 ENCOUNTER — APPOINTMENT (OUTPATIENT)
Dept: INTERNAL MEDICINE | Facility: CLINIC | Age: 60
End: 2020-06-12
Payer: COMMERCIAL

## 2020-06-12 VITALS
TEMPERATURE: 98.8 F | HEART RATE: 79 BPM | DIASTOLIC BLOOD PRESSURE: 60 MMHG | BODY MASS INDEX: 30.7 KG/M2 | WEIGHT: 260 LBS | HEIGHT: 77 IN | OXYGEN SATURATION: 97 % | SYSTOLIC BLOOD PRESSURE: 128 MMHG | RESPIRATION RATE: 16 BRPM

## 2020-06-12 LAB — MUV IGM SER QL IA: 1.32 AU

## 2020-06-12 PROCEDURE — 90746 HEPB VACCINE 3 DOSE ADULT IM: CPT

## 2020-06-12 PROCEDURE — 90471 IMMUNIZATION ADMIN: CPT

## 2020-06-12 PROCEDURE — 99213 OFFICE O/P EST LOW 20 MIN: CPT | Mod: 25

## 2020-06-12 NOTE — PHYSICAL EXAM
[No Acute Distress] : no acute distress [Well Developed] : well developed [Well Nourished] : well nourished [Normal Sclera/Conjunctiva] : normal sclera/conjunctiva [Well-Appearing] : well-appearing [PERRL] : pupils equal round and reactive to light [EOMI] : extraocular movements intact [No JVD] : no jugular venous distention [Normal Oropharynx] : the oropharynx was normal [Normal Outer Ear/Nose] : the outer ears and nose were normal in appearance [Supple] : supple [No Lymphadenopathy] : no lymphadenopathy [No Accessory Muscle Use] : no accessory muscle use [Thyroid Normal, No Nodules] : the thyroid was normal and there were no nodules present [No Respiratory Distress] : no respiratory distress  [Clear to Auscultation] : lungs were clear to auscultation bilaterally [Regular Rhythm] : with a regular rhythm [Normal Rate] : normal rate  [No Carotid Bruits] : no carotid bruits [Normal S1, S2] : normal S1 and S2 [No Murmur] : no murmur heard [Pedal Pulses Present] : the pedal pulses are present [No Varicosities] : no varicosities [No Abdominal Bruit] : a ~M bruit was not heard ~T in the abdomen [No Edema] : there was no peripheral edema [No Extremity Clubbing/Cyanosis] : no extremity clubbing/cyanosis [No Palpable Aorta] : no palpable aorta [Non Tender] : non-tender [Soft] : abdomen soft [No Masses] : no abdominal mass palpated [No HSM] : no HSM [Non-distended] : non-distended [Normal Anterior Cervical Nodes] : no anterior cervical lymphadenopathy [Normal Posterior Cervical Nodes] : no posterior cervical lymphadenopathy [Normal Bowel Sounds] : normal bowel sounds [No CVA Tenderness] : no CVA  tenderness [No Spinal Tenderness] : no spinal tenderness [No Rash] : no rash [No Joint Swelling] : no joint swelling [Grossly Normal Strength/Tone] : grossly normal strength/tone [Normal Gait] : normal gait [Coordination Grossly Intact] : coordination grossly intact [No Focal Deficits] : no focal deficits [Deep Tendon Reflexes (DTR)] : deep tendon reflexes were 2+ and symmetric [Normal Affect] : the affect was normal [Normal Insight/Judgement] : insight and judgment were intact

## 2020-06-14 NOTE — ASSESSMENT
[FreeTextEntry1] : Patient is a 59 year old male with a past medical history as above who presents for the Hepatitis B Vaccine.

## 2020-06-14 NOTE — PLAN
[FreeTextEntry1] : Infectious Disease\par Hepatitis B Booster Vaccine - 0.5mL x 1 administered intramuscularly\par Cardiology\par essential hypertension - continue Lisinopril 10mg p.o.q.d. - continue low sodium diet and weight loss \par continue Aspirin 81mg p.o.q.d. \par Endocrinology\par diabetes - continue Janumet 50-1000mg BID p.o.q.d. with meals, Jardiance 25mg p.o.q.d., and Glipizide ER 2.5mg p.o.q.d. as directed; continue Lisinopril 10mg p.o.q.d. - continue low carbohydrate/low sugar diet \par hyperlipidemia - continue Simvastatin 40mg p.o.q.d. - continue low cholesterol/low fat diet \par obesity - continue low carbohydrate diet and CV exercise\par \par Medical form completed per patient's request.

## 2020-06-14 NOTE — HISTORY OF PRESENT ILLNESS
[FreeTextEntry1] : Hepatitis B Vaccine [de-identified] : Patient is a 59 year old male with a past medical history as below who presents for the Hepatitis B Vaccine. Patient states he is taking all medications as prescribed and denies any adverse reactions or side effects.  He denies lightheadedness or dizziness on Lisinopril. He denies any new arthralgias or myalgias on Simvastatin. He denies any recent episodes of hypoglycemia. Patient requests medical form be completed.

## 2020-06-14 NOTE — ADDENDUM
[FreeTextEntry1] : I, Eric Pandya, acted solely as scribe for Dr. Aidan Julio DO on this date 06/12/2020  4:10PM .\par \par All medical record entries made by the Scribe were at my, Dr. Aidan Julio DO direction and personally dictated by me on 06/12/2020  4:10PM. I have reviewed the chart and agree that the record accurately reflects my personal performance of the history, physical exam, assessment and plan. I have also personally directed, reviewed and agreed with the chart.\par

## 2020-06-15 LAB — MEV IGM SER QL: NEGATIVE

## 2020-06-23 ENCOUNTER — RX RENEWAL (OUTPATIENT)
Age: 60
End: 2020-06-23

## 2020-06-29 ENCOUNTER — APPOINTMENT (OUTPATIENT)
Dept: CARDIOLOGY | Facility: CLINIC | Age: 60
End: 2020-06-29
Payer: COMMERCIAL

## 2020-06-29 ENCOUNTER — NON-APPOINTMENT (OUTPATIENT)
Age: 60
End: 2020-06-29

## 2020-06-29 VITALS
SYSTOLIC BLOOD PRESSURE: 129 MMHG | HEIGHT: 77 IN | BODY MASS INDEX: 30.7 KG/M2 | OXYGEN SATURATION: 94 % | HEART RATE: 74 BPM | WEIGHT: 260 LBS | DIASTOLIC BLOOD PRESSURE: 73 MMHG

## 2020-06-29 DIAGNOSIS — R06.00 DYSPNEA, UNSPECIFIED: ICD-10-CM

## 2020-06-29 DIAGNOSIS — R94.31 ABNORMAL ELECTROCARDIOGRAM [ECG] [EKG]: ICD-10-CM

## 2020-06-29 PROCEDURE — 93000 ELECTROCARDIOGRAM COMPLETE: CPT

## 2020-06-29 PROCEDURE — 99244 OFF/OP CNSLTJ NEW/EST MOD 40: CPT

## 2020-06-29 NOTE — PHYSICAL EXAM
[General Appearance - Well Developed] : well developed [Normal Appearance] : normal appearance [Well Groomed] : well groomed [General Appearance - Well Nourished] : well nourished [No Deformities] : no deformities [General Appearance - In No Acute Distress] : no acute distress [Normal Conjunctiva] : the conjunctiva exhibited no abnormalities [Eyelids - No Xanthelasma] : the eyelids demonstrated no xanthelasmas [Normal Oral Mucosa] : normal oral mucosa [No Oral Pallor] : no oral pallor [No Oral Cyanosis] : no oral cyanosis [Normal Jugular Venous A Waves Present] : normal jugular venous A waves present [Normal Jugular Venous V Waves Present] : normal jugular venous V waves present [No Jugular Venous Coronado A Waves] : no jugular venous coronado A waves [Normal Rate] : normal [Normal S1] : normal S1 [Normal S2] : normal S2 [No Murmur] : no murmurs heard [2+] : left 2+ [No Abnormalities] : the abdominal aorta was not enlarged and no bruit was heard [No Pitting Edema] : no pitting edema present [Respiration, Rhythm And Depth] : normal respiratory rhythm and effort [Auscultation Breath Sounds / Voice Sounds] : lungs were clear to auscultation bilaterally [Abdomen Soft] : soft [Exaggerated Use Of Accessory Muscles For Inspiration] : no accessory muscle use [Abdomen Tenderness] : non-tender [Abdomen Mass (___ Cm)] : no abdominal mass palpated [Gait - Sufficient For Exercise Testing] : the gait was sufficient for exercise testing [Abnormal Walk] : normal gait [Petechial Hemorrhages (___cm)] : no petechial hemorrhages [Nail Clubbing] : no clubbing of the fingernails [Cyanosis, Localized] : no localized cyanosis [Skin Color & Pigmentation] : normal skin color and pigmentation [] : no rash [Skin Lesions] : no skin lesions [No Venous Stasis] : no venous stasis [No Skin Ulcers] : no skin ulcer [No Xanthoma] : no  xanthoma was observed [Oriented To Time, Place, And Person] : oriented to person, place, and time [Affect] : the affect was normal [No Anxiety] : not feeling anxious [Mood] : the mood was normal [FreeTextEntry1] : overweight [S3] : no S3 [Right Carotid Bruit] : no bruit heard over the right carotid [S4] : no S4 [Left Carotid Bruit] : no bruit heard over the left carotid [Right Femoral Bruit] : no bruit heard over the right femoral artery [Left Femoral Bruit] : no bruit heard over the left femoral artery

## 2020-06-29 NOTE — DISCUSSION/SUMMARY
[With Me] : with me [FreeTextEntry1] : Milan is a 59-year-old male with diabetes and hyperlipidemia here for evaluation. He is feeling well, other than some mild dyspnea, which he attributes to the weather. His EKG is abnormal, with a left axis deviation, nonspecific T wave abnormality, and poor R wave progression. His blood pressure is at goal on lisinopril. His physical exam is unremarkable.\par \par In the setting of his abnormal EKG, he will have a 2-D echocardiogram to evaluate for structural heart disease, and an exercise nuclear stress test to evaluate for ischemia. I stressed the importance of diet, exercise, and weight loss, to reduce his cholesterol, and overall cardiovascular risk. He will try to change his diet, and will remain on simvastatin. We will speak after the above tests, and arrange followup.

## 2020-06-29 NOTE — HISTORY OF PRESENT ILLNESS
[FreeTextEntry1] : Milan is a 59-year-old male here for evaluation. He has a history of diabetes and hyperlipidemia. He is a former smoker, who quit 25 years ago. He has lost about 50 pounds over the last few years.\par \par He was found to have an abnormal EKG, at a routine visit, and is here for evaluation. He reports no chest pain, palpitations, or dyspnea at rest. He does report some shortness of breath when outside, which he attributes to the weather. He denies orthopnea, PND and lower extremity edema.\par He has a family history of diabetes, though no CAD. He currently takes lisinopril, Zocor, and aspirin, along with his diabetes medications.\par His most recent hemoglobin A1c was 8.2, which he attributes to being home during the pandemic. His LDL is 82.\par He went to the ER last week with a cat bite.

## 2020-07-15 ENCOUNTER — APPOINTMENT (OUTPATIENT)
Dept: CARDIOLOGY | Facility: CLINIC | Age: 60
End: 2020-07-15
Payer: COMMERCIAL

## 2020-07-15 PROCEDURE — 93306 TTE W/DOPPLER COMPLETE: CPT

## 2020-07-29 ENCOUNTER — APPOINTMENT (OUTPATIENT)
Dept: CARDIOLOGY | Facility: CLINIC | Age: 60
End: 2020-07-29
Payer: COMMERCIAL

## 2020-07-29 PROCEDURE — 78452 HT MUSCLE IMAGE SPECT MULT: CPT

## 2020-07-29 PROCEDURE — A9500: CPT

## 2020-07-29 PROCEDURE — 93015 CV STRESS TEST SUPVJ I&R: CPT

## 2020-09-11 ENCOUNTER — RX RENEWAL (OUTPATIENT)
Age: 60
End: 2020-09-11

## 2020-09-13 ENCOUNTER — RX RENEWAL (OUTPATIENT)
Age: 60
End: 2020-09-13

## 2020-09-21 ENCOUNTER — APPOINTMENT (OUTPATIENT)
Dept: INTERNAL MEDICINE | Facility: CLINIC | Age: 60
End: 2020-09-21
Payer: COMMERCIAL

## 2020-09-21 VITALS
DIASTOLIC BLOOD PRESSURE: 64 MMHG | TEMPERATURE: 98.5 F | SYSTOLIC BLOOD PRESSURE: 112 MMHG | RESPIRATION RATE: 16 BRPM | HEART RATE: 69 BPM | BODY MASS INDEX: 31.19 KG/M2 | HEIGHT: 77 IN | WEIGHT: 264.13 LBS | OXYGEN SATURATION: 97 %

## 2020-09-21 PROCEDURE — G0442 ANNUAL ALCOHOL SCREEN 15 MIN: CPT

## 2020-09-21 PROCEDURE — 90732 PPSV23 VACC 2 YRS+ SUBQ/IM: CPT

## 2020-09-21 PROCEDURE — 90471 IMMUNIZATION ADMIN: CPT

## 2020-09-21 PROCEDURE — 99214 OFFICE O/P EST MOD 30 MIN: CPT | Mod: 25

## 2020-09-21 PROCEDURE — 36415 COLL VENOUS BLD VENIPUNCTURE: CPT

## 2020-09-21 NOTE — PHYSICAL EXAM
[No Acute Distress] : no acute distress [Well Nourished] : well nourished [Well Developed] : well developed [Well-Appearing] : well-appearing [Normal Sclera/Conjunctiva] : normal sclera/conjunctiva [PERRL] : pupils equal round and reactive to light [EOMI] : extraocular movements intact [Normal Outer Ear/Nose] : the outer ears and nose were normal in appearance [Normal Oropharynx] : the oropharynx was normal [No JVD] : no jugular venous distention [No Lymphadenopathy] : no lymphadenopathy [Supple] : supple [Thyroid Normal, No Nodules] : the thyroid was normal and there were no nodules present [No Respiratory Distress] : no respiratory distress  [No Accessory Muscle Use] : no accessory muscle use [Clear to Auscultation] : lungs were clear to auscultation bilaterally [Normal Rate] : normal rate  [Regular Rhythm] : with a regular rhythm [Normal S1, S2] : normal S1 and S2 [No Murmur] : no murmur heard [No Carotid Bruits] : no carotid bruits [No Abdominal Bruit] : a ~M bruit was not heard ~T in the abdomen [No Varicosities] : no varicosities [Pedal Pulses Present] : the pedal pulses are present [No Edema] : there was no peripheral edema [No Palpable Aorta] : no palpable aorta [No Extremity Clubbing/Cyanosis] : no extremity clubbing/cyanosis [Soft] : abdomen soft [Non Tender] : non-tender [Non-distended] : non-distended [No Masses] : no abdominal mass palpated [No HSM] : no HSM [Normal Bowel Sounds] : normal bowel sounds [Normal Posterior Cervical Nodes] : no posterior cervical lymphadenopathy [Normal Anterior Cervical Nodes] : no anterior cervical lymphadenopathy [No CVA Tenderness] : no CVA  tenderness [No Spinal Tenderness] : no spinal tenderness [No Joint Swelling] : no joint swelling [Grossly Normal Strength/Tone] : grossly normal strength/tone [No Rash] : no rash [Coordination Grossly Intact] : coordination grossly intact [No Focal Deficits] : no focal deficits [Normal Gait] : normal gait [Normal Affect] : the affect was normal [Normal Insight/Judgement] : insight and judgment were intact [de-identified] : obese

## 2020-09-21 NOTE — ADDENDUM
[FreeTextEntry1] : I, Eric Pandya, acted solely as scribe for Dr. Aidan Julio DO on this date 09/21/2020 11:10AM .\par \par All medical record entries made by the Scribe were at my, Dr. Aidan Julio DO direction and personally dictated by me on 09/21/2020 11:10AM. I have reviewed the chart and agree that the record accurately reflects my personal performance of the history, physical exam, assessment and plan. I have also personally directed, reviewed and agreed with the chart.\par

## 2020-09-21 NOTE — HEALTH RISK ASSESSMENT
[Yes] : Yes [Monthly or less (1 pt)] : Monthly or less (1 point) [1 or 2 (0 pts)] : 1 or 2 (0 points) [Less than monthly (1 pt)] : Less than monthly (1 point) [No] : In the past 12 months have you used drugs other than those required for medical reasons? No [0] : 2) Feeling down, depressed, or hopeless: Not at all (0) [] : No [Audit-CScore] : 2 [[id number]] : 0

## 2020-09-21 NOTE — ASSESSMENT
[FreeTextEntry1] : Patient is a 59 year old male with a past medical history as above who presents for fasting blood work, Rx refill, and general follow-up.\par

## 2020-09-21 NOTE — PLAN
[FreeTextEntry1] : Cardiology\par essential hypertension - continue Lisinopril 10mg p.o.q.d., Rx filled - continue low sodium diet and weight loss \par hyperlipidemia - continue Simvastatin 40mg p.o.q.d., Rx filled - continue low cholesterol/low fat diet - check FLP and LFTs\par continue Aspirin 81mg p.o.q.d. \par Endocrinology\par diabetes - continue Janumet 50-1000mg BID p.o.q.d. with meals, Rx filled, Jardiance 25mg p.o.q.d., Rx filled, and Glipizide ER 2.5mg p.o.q.d. as directed, Rx filled; continue Lisinopril 10mg p.o.q.d., Rx filled and Simvastatin 40mg p.o.q.d., Rx filled - continue low carbohydrate/low sugar diet - check hemoglobin A1C and urine microalbumin/creatinine ratio\par obesity - continue low carbohydrate diet and CV exercise\par Immunization\par Pneumovax 23 - 0.5mL x 1 administered intramuscularly to right deltoid \par flu vaccine - vaccine to be administered at work; evidence of administration to be requested\par Tdap (Adacel) Vaccine - evidence of vaccine administration to be requested\par \par check male panel and UA

## 2020-09-21 NOTE — HISTORY OF PRESENT ILLNESS
[FreeTextEntry1] : fasting blood work, Rx refill, and general follow-up\par  [de-identified] : Patient is a 59 year old male with a past medical history as below who presents for fasting blood work, Rx refill, and general follow-up. Patient states he is taking all medications as prescribed and denies any adverse reactions or side effects. He denies lightheadedness or dizziness on Lisinopril. He denies any diffuse arthralgias or myalgias on Simvastatin. He denies any recent episodes of hypoglycemia. Patient will receive the flu vaccine at work. He is due to receive the Pneumovax 23 given Hx of DM. He notes receiving the Tetanus Vaccine in late June. Patient requests Rx refill for all medications.

## 2020-09-24 LAB
25(OH)D3 SERPL-MCNC: 58 NG/ML
ALBUMIN SERPL ELPH-MCNC: 5 G/DL
ALP BLD-CCNC: 70 U/L
ALT SERPL-CCNC: 37 U/L
ANION GAP SERPL CALC-SCNC: 15 MMOL/L
AST SERPL-CCNC: 23 U/L
BASOPHILS # BLD AUTO: 0.05 K/UL
BASOPHILS NFR BLD AUTO: 0.5 %
BILIRUB SERPL-MCNC: 0.4 MG/DL
BUN SERPL-MCNC: 21 MG/DL
CALCIUM SERPL-MCNC: 9.8 MG/DL
CHLORIDE SERPL-SCNC: 104 MMOL/L
CHOLEST SERPL-MCNC: 188 MG/DL
CHOLEST/HDLC SERPL: 4.2 RATIO
CO2 SERPL-SCNC: 23 MMOL/L
CREAT SERPL-MCNC: 0.95 MG/DL
CREAT SPEC-SCNC: 93 MG/DL
EOSINOPHIL # BLD AUTO: 0.4 K/UL
EOSINOPHIL NFR BLD AUTO: 4.4 %
ESTIMATED AVERAGE GLUCOSE: 171 MG/DL
GLUCOSE SERPL-MCNC: 169 MG/DL
HBA1C MFR BLD HPLC: 7.6 %
HCT VFR BLD CALC: 50 %
HDLC SERPL-MCNC: 44 MG/DL
HGB BLD-MCNC: 16.1 G/DL
IMM GRANULOCYTES NFR BLD AUTO: 0.3 %
LDLC SERPL CALC-MCNC: 97 MG/DL
LYMPHOCYTES # BLD AUTO: 3.17 K/UL
LYMPHOCYTES NFR BLD AUTO: 34.7 %
MAN DIFF?: NORMAL
MCHC RBC-ENTMCNC: 30.8 PG
MCHC RBC-ENTMCNC: 32.2 GM/DL
MCV RBC AUTO: 95.6 FL
MICROALBUMIN 24H UR DL<=1MG/L-MCNC: <1.2 MG/DL
MICROALBUMIN/CREAT 24H UR-RTO: NORMAL MG/G
MONOCYTES # BLD AUTO: 0.7 K/UL
MONOCYTES NFR BLD AUTO: 7.7 %
NEUTROPHILS # BLD AUTO: 4.79 K/UL
NEUTROPHILS NFR BLD AUTO: 52.4 %
PLATELET # BLD AUTO: 292 K/UL
POTASSIUM SERPL-SCNC: 4.8 MMOL/L
PROT SERPL-MCNC: 7.3 G/DL
PSA SERPL-MCNC: 0.71 NG/ML
RBC # BLD: 5.23 M/UL
RBC # FLD: 12.7 %
SODIUM SERPL-SCNC: 141 MMOL/L
TRIGL SERPL-MCNC: 234 MG/DL
TSH SERPL-ACNC: 2.23 UIU/ML
WBC # FLD AUTO: 9.14 K/UL

## 2021-01-29 ENCOUNTER — RX RENEWAL (OUTPATIENT)
Age: 61
End: 2021-01-29

## 2021-02-09 ENCOUNTER — APPOINTMENT (OUTPATIENT)
Dept: INTERNAL MEDICINE | Facility: CLINIC | Age: 61
End: 2021-02-09
Payer: COMMERCIAL

## 2021-02-09 VITALS
BODY MASS INDEX: 31.88 KG/M2 | WEIGHT: 270 LBS | DIASTOLIC BLOOD PRESSURE: 68 MMHG | HEIGHT: 77 IN | RESPIRATION RATE: 16 BRPM | OXYGEN SATURATION: 97 % | TEMPERATURE: 97.4 F | SYSTOLIC BLOOD PRESSURE: 110 MMHG | HEART RATE: 72 BPM

## 2021-02-09 DIAGNOSIS — R07.81 PLEURODYNIA: ICD-10-CM

## 2021-02-09 PROCEDURE — 36415 COLL VENOUS BLD VENIPUNCTURE: CPT

## 2021-02-09 PROCEDURE — 99214 OFFICE O/P EST MOD 30 MIN: CPT | Mod: 25

## 2021-02-09 PROCEDURE — 99072 ADDL SUPL MATRL&STAF TM PHE: CPT

## 2021-02-09 NOTE — HEALTH RISK ASSESSMENT
[Yes] : Yes [Monthly or less (1 pt)] : Monthly or less (1 point) [1 or 2 (0 pts)] : 1 or 2 (0 points) [Less than monthly (1 pt)] : Less than monthly (1 point) [No] : In the past 12 months have you used drugs other than those required for medical reasons? No [0] : 2) Feeling down, depressed, or hopeless: Not at all (0) [] : No [Audit-CScore] : 2 [[id number]] : 0

## 2021-02-09 NOTE — REVIEW OF SYSTEMS
[Negative] : Heme/Lymph [Recent Change In Weight] : ~T recent weight change [FreeTextEntry2] : weight gain [FreeTextEntry5] : lower extremity varicose veins [FreeTextEntry9] : right rib pain

## 2021-02-09 NOTE — PLAN
[FreeTextEntry1] : Ophthalmology\par Advised to follow up with ophthalmologist, Dr. Rodriguez for annual diabetic eye exam; results to be requested\par Pulmonary\par former smoker - referred to pulmonologist, Dr. Garcia \par Vascular\par lower extremity varicose veins - patient to follow up with vascular surgeon to further discuss treatment options \par Cardiology\par essential hypertension - continue Lisinopril 10mg p.o.q.d. - advised low sodium diet and weight loss \par dyslipidemia - continue Simvastatin 40mg p.o.q.d. - advised low cholesterol/low fat diet - check FLP and LFTs\par continue Aspirin 81mg p.o.q.d. \par Endocrinology\par diabetes - continue Janumet 50-1000mg BID p.o.q.d. with meals as directed, Jardiance 25mg p.o.q.d., and Glipizide ER 5mg p.o.q.d. as directed; continue Lisinopril 10mg p.o.q.d. and Simvastatin 40mg p.o.q.d. - advised low carbohydrate/low sugar diet - check hemoglobin A1C and urine microalbumin/creatinine ratio\par obesity - advised low carbohydrate diet and CV exercise\par Podiatry\par Results of last diabetic feet examination to be requested from podiatrist, Dr. Callahan's office\par Musculoskeletal\par right rib pain - Rx given for X-Ray of Ribs, Right\par \par check male panel, hemoglobin A1C, and urine microalbumin/creatinine ratio

## 2021-02-09 NOTE — HISTORY OF PRESENT ILLNESS
[FreeTextEntry1] : fasting blood work and general follow-up\par  [de-identified] : Patient is a 60 year old male with a past medical history as below who presents for fasting blood work and general follow-up. Patient states he is taking all medications as prescribed and denies any adverse reactions or side effects. He denies lightheadedness or dizziness on Lisinopril. He denies diffuse arthralgias or myalgias on Simvastatin. He denies any recent episodes of hypoglycemia. Patient is due to see ophthalmologist, Dr. Rodriguez for his annual diabetic eye exam. He saw podiatrist, Dr. Callahan 1 month ago for his annual diabetic feet examination. Patient notes falling on his right side last week while outside. He denied any pain immediately after the fall, but has since noted an episode of severe right rib pain. Patient notes lower extremity varicose veins. He is considering surgery with a vascular specialist. Patient inquires about a referral to a pulmonologist. Patient attributes weight gain since his last visit to a poor diet during the holidays. Patient has received both doses of the COVID-19 Vaccine.

## 2021-02-09 NOTE — ADDENDUM
[FreeTextEntry1] : I, Eric Pandya, acted solely as scribe for Dr. Aidan Julio DO on this date 02/09/2021 10:10AM .\par \par All medical record entries made by the Scribe were at my, Dr. Aidan Julio DO direction and personally dictated by me on 02/09/2021 10:10AM. I have reviewed the chart and agree that the record accurately reflects my personal performance of the history, physical exam, assessment and plan. I have also personally directed, reviewed and agreed with the chart.\par

## 2021-02-09 NOTE — ASSESSMENT
[FreeTextEntry1] : Patient is a 60 year old male with a past medical history as above who presents for fasting blood work and general follow-up.\par

## 2021-02-09 NOTE — PHYSICAL EXAM
[No Acute Distress] : no acute distress [Well Nourished] : well nourished [Well Developed] : well developed [Well-Appearing] : well-appearing [Normal Sclera/Conjunctiva] : normal sclera/conjunctiva [PERRL] : pupils equal round and reactive to light [EOMI] : extraocular movements intact [Normal Outer Ear/Nose] : the outer ears and nose were normal in appearance [Normal Oropharynx] : the oropharynx was normal [No JVD] : no jugular venous distention [No Lymphadenopathy] : no lymphadenopathy [Supple] : supple [Thyroid Normal, No Nodules] : the thyroid was normal and there were no nodules present [No Respiratory Distress] : no respiratory distress  [No Accessory Muscle Use] : no accessory muscle use [Clear to Auscultation] : lungs were clear to auscultation bilaterally [Normal Rate] : normal rate  [Regular Rhythm] : with a regular rhythm [Normal S1, S2] : normal S1 and S2 [No Murmur] : no murmur heard [No Carotid Bruits] : no carotid bruits [No Abdominal Bruit] : a ~M bruit was not heard ~T in the abdomen [Pedal Pulses Present] : the pedal pulses are present [No Edema] : there was no peripheral edema [No Palpable Aorta] : no palpable aorta [No Extremity Clubbing/Cyanosis] : no extremity clubbing/cyanosis [Soft] : abdomen soft [Non Tender] : non-tender [Non-distended] : non-distended [No Masses] : no abdominal mass palpated [No HSM] : no HSM [Normal Bowel Sounds] : normal bowel sounds [Normal Posterior Cervical Nodes] : no posterior cervical lymphadenopathy [Normal Anterior Cervical Nodes] : no anterior cervical lymphadenopathy [No CVA Tenderness] : no CVA  tenderness [No Spinal Tenderness] : no spinal tenderness [No Joint Swelling] : no joint swelling [Grossly Normal Strength/Tone] : grossly normal strength/tone [No Rash] : no rash [Coordination Grossly Intact] : coordination grossly intact [No Focal Deficits] : no focal deficits [Normal Gait] : normal gait [Deep Tendon Reflexes (DTR)] : deep tendon reflexes were 2+ and symmetric [Normal Affect] : the affect was normal [Normal Insight/Judgement] : insight and judgment were intact [de-identified] : lower extremity varicose veins  [de-identified] : obese

## 2021-02-13 ENCOUNTER — OUTPATIENT (OUTPATIENT)
Dept: OUTPATIENT SERVICES | Facility: HOSPITAL | Age: 61
LOS: 1 days | End: 2021-02-13
Payer: COMMERCIAL

## 2021-02-13 ENCOUNTER — APPOINTMENT (OUTPATIENT)
Dept: RADIOLOGY | Facility: CLINIC | Age: 61
End: 2021-02-13
Payer: COMMERCIAL

## 2021-02-13 DIAGNOSIS — Z00.8 ENCOUNTER FOR OTHER GENERAL EXAMINATION: ICD-10-CM

## 2021-02-13 PROCEDURE — 71100 X-RAY EXAM RIBS UNI 2 VIEWS: CPT | Mod: 26,RT

## 2021-02-13 PROCEDURE — 71100 X-RAY EXAM RIBS UNI 2 VIEWS: CPT

## 2021-02-14 LAB
25(OH)D3 SERPL-MCNC: 62.2 NG/ML
ALBUMIN SERPL ELPH-MCNC: 4.9 G/DL
ALP BLD-CCNC: 71 U/L
ALT SERPL-CCNC: 31 U/L
ANION GAP SERPL CALC-SCNC: 18 MMOL/L
AST SERPL-CCNC: 16 U/L
BASOPHILS # BLD AUTO: 0.05 K/UL
BASOPHILS NFR BLD AUTO: 0.5 %
BILIRUB SERPL-MCNC: 0.4 MG/DL
BUN SERPL-MCNC: 17 MG/DL
CALCIUM SERPL-MCNC: 9.7 MG/DL
CHLORIDE SERPL-SCNC: 100 MMOL/L
CHOLEST SERPL-MCNC: 160 MG/DL
CO2 SERPL-SCNC: 22 MMOL/L
CREAT SERPL-MCNC: 0.94 MG/DL
CREAT SPEC-SCNC: 92 MG/DL
EOSINOPHIL # BLD AUTO: 0.47 K/UL
EOSINOPHIL NFR BLD AUTO: 5 %
ESTIMATED AVERAGE GLUCOSE: 174 MG/DL
GLUCOSE SERPL-MCNC: 160 MG/DL
HBA1C MFR BLD HPLC: 7.7 %
HCT VFR BLD CALC: 52.5 %
HDLC SERPL-MCNC: 43 MG/DL
HGB BLD-MCNC: 15.9 G/DL
IMM GRANULOCYTES NFR BLD AUTO: 0.3 %
LDLC SERPL CALC-MCNC: 86 MG/DL
LYMPHOCYTES # BLD AUTO: 2.99 K/UL
LYMPHOCYTES NFR BLD AUTO: 31.5 %
MAN DIFF?: NORMAL
MCHC RBC-ENTMCNC: 30.3 GM/DL
MCHC RBC-ENTMCNC: 30.5 PG
MCV RBC AUTO: 100.6 FL
MICROALBUMIN 24H UR DL<=1MG/L-MCNC: <1.2 MG/DL
MICROALBUMIN/CREAT 24H UR-RTO: NORMAL MG/G
MONOCYTES # BLD AUTO: 0.71 K/UL
MONOCYTES NFR BLD AUTO: 7.5 %
NEUTROPHILS # BLD AUTO: 5.23 K/UL
NEUTROPHILS NFR BLD AUTO: 55.2 %
NONHDLC SERPL-MCNC: 118 MG/DL
PLATELET # BLD AUTO: 265 K/UL
POTASSIUM SERPL-SCNC: 4.6 MMOL/L
PROT SERPL-MCNC: 7.1 G/DL
PSA SERPL-MCNC: 0.52 NG/ML
RBC # BLD: 5.22 M/UL
RBC # FLD: 12.7 %
SODIUM SERPL-SCNC: 140 MMOL/L
TRIGL SERPL-MCNC: 157 MG/DL
TSH SERPL-ACNC: 1.9 UIU/ML
WBC # FLD AUTO: 9.48 K/UL

## 2021-02-22 ENCOUNTER — NON-APPOINTMENT (OUTPATIENT)
Age: 61
End: 2021-02-22

## 2021-03-02 ENCOUNTER — RX RENEWAL (OUTPATIENT)
Age: 61
End: 2021-03-02

## 2021-03-04 ENCOUNTER — RX RENEWAL (OUTPATIENT)
Age: 61
End: 2021-03-04

## 2021-04-05 ENCOUNTER — RX RENEWAL (OUTPATIENT)
Age: 61
End: 2021-04-05

## 2021-06-04 ENCOUNTER — RX RENEWAL (OUTPATIENT)
Age: 61
End: 2021-06-04

## 2021-06-07 ENCOUNTER — APPOINTMENT (OUTPATIENT)
Dept: INTERNAL MEDICINE | Facility: CLINIC | Age: 61
End: 2021-06-07
Payer: COMMERCIAL

## 2021-06-07 ENCOUNTER — NON-APPOINTMENT (OUTPATIENT)
Age: 61
End: 2021-06-07

## 2021-06-07 VITALS
TEMPERATURE: 97.2 F | BODY MASS INDEX: 32.34 KG/M2 | HEIGHT: 77 IN | WEIGHT: 273.9 LBS | HEART RATE: 57 BPM | DIASTOLIC BLOOD PRESSURE: 72 MMHG | SYSTOLIC BLOOD PRESSURE: 146 MMHG | OXYGEN SATURATION: 95 % | RESPIRATION RATE: 16 BRPM

## 2021-06-07 VITALS — SYSTOLIC BLOOD PRESSURE: 128 MMHG | DIASTOLIC BLOOD PRESSURE: 80 MMHG

## 2021-06-07 DIAGNOSIS — Z11.1 ENCOUNTER FOR SCREENING FOR RESPIRATORY TUBERCULOSIS: ICD-10-CM

## 2021-06-07 DIAGNOSIS — Z71.89 OTHER SPECIFIED COUNSELING: ICD-10-CM

## 2021-06-07 DIAGNOSIS — Z00.00 ENCOUNTER FOR GENERAL ADULT MEDICAL EXAMINATION W/OUT ABNORMAL FINDINGS: ICD-10-CM

## 2021-06-07 PROCEDURE — 93000 ELECTROCARDIOGRAM COMPLETE: CPT

## 2021-06-07 PROCEDURE — 36415 COLL VENOUS BLD VENIPUNCTURE: CPT

## 2021-06-07 PROCEDURE — 99072 ADDL SUPL MATRL&STAF TM PHE: CPT

## 2021-06-07 PROCEDURE — 86580 TB INTRADERMAL TEST: CPT

## 2021-06-07 PROCEDURE — 99396 PREV VISIT EST AGE 40-64: CPT | Mod: 25

## 2021-06-07 RX ORDER — GLIPIZIDE 5 MG/1
5 TABLET, FILM COATED, EXTENDED RELEASE ORAL
Qty: 180 | Refills: 1 | Status: DISCONTINUED | COMMUNITY
Start: 2021-03-02 | End: 2021-06-07

## 2021-06-07 NOTE — ASSESSMENT
[FreeTextEntry1] : Patient is a 60 year old male with a past medical history as above who presents for an annual wellness visit.\par

## 2021-06-07 NOTE — PHYSICAL EXAM
[No Acute Distress] : no acute distress [Well Nourished] : well nourished [Well Developed] : well developed [Well-Appearing] : well-appearing [Normal Sclera/Conjunctiva] : normal sclera/conjunctiva [PERRL] : pupils equal round and reactive to light [EOMI] : extraocular movements intact [Normal Outer Ear/Nose] : the outer ears and nose were normal in appearance [Normal Oropharynx] : the oropharynx was normal [No JVD] : no jugular venous distention [No Lymphadenopathy] : no lymphadenopathy [Supple] : supple [Thyroid Normal, No Nodules] : the thyroid was normal and there were no nodules present [No Respiratory Distress] : no respiratory distress  [No Accessory Muscle Use] : no accessory muscle use [Clear to Auscultation] : lungs were clear to auscultation bilaterally [Normal Rate] : normal rate  [Regular Rhythm] : with a regular rhythm [Normal S1, S2] : normal S1 and S2 [No Murmur] : no murmur heard [No Carotid Bruits] : no carotid bruits [No Abdominal Bruit] : a ~M bruit was not heard ~T in the abdomen [No Varicosities] : no varicosities [Pedal Pulses Present] : the pedal pulses are present [No Edema] : there was no peripheral edema [No Palpable Aorta] : no palpable aorta [No Extremity Clubbing/Cyanosis] : no extremity clubbing/cyanosis [Soft] : abdomen soft [Non Tender] : non-tender [Non-distended] : non-distended [No Masses] : no abdominal mass palpated [No HSM] : no HSM [Normal Bowel Sounds] : normal bowel sounds [Normal Posterior Cervical Nodes] : no posterior cervical lymphadenopathy [Normal Anterior Cervical Nodes] : no anterior cervical lymphadenopathy [No CVA Tenderness] : no CVA  tenderness [No Spinal Tenderness] : no spinal tenderness [No Joint Swelling] : no joint swelling [Grossly Normal Strength/Tone] : grossly normal strength/tone [No Rash] : no rash [Coordination Grossly Intact] : coordination grossly intact [No Focal Deficits] : no focal deficits [Normal Gait] : normal gait [Deep Tendon Reflexes (DTR)] : deep tendon reflexes were 2+ and symmetric [Normal Affect] : the affect was normal [Normal Insight/Judgement] : insight and judgment were intact [de-identified] : obese

## 2021-06-07 NOTE — HISTORY OF PRESENT ILLNESS
[FreeTextEntry1] : annual wellness visit [de-identified] : Patient is a 60 year old male with a past medical history as below who presents for an annual wellness visit. Patient states he is taking all medications as prescribed and denies any adverse reactions or side effects. He denies lightheadedness or dizziness on Lisinopril. He denies diffuse arthralgias or myalgias on Simvastatin. He denies any recent episodes of hypoglycemia. Patient states his last screening colonoscopy was in 2017. Patient received the flu vaccine for this past season on 9/9/20. He received the Pneumovax 23 on 9/21/20. He received the Tdap (Adacel) Vaccine on 6/20/20. He has not received the Shingles (Shingrix) Vaccine. He has received both doses of the COVID-19 Vaccine (Pfizer). Patient requests medical forms be completed for work (requires additional blood work: MMR IgG Antibody, Varicella Zoster IgG Antibody; also requires PPD).

## 2021-06-07 NOTE — PLAN
[FreeTextEntry1] : Ophthalmology\par Advised to follow up with ophthalmologist, Dr. Rodriguez for annual diabetic eye exam; results to be requested\par Pulmonary\par former smoker - check EKG (results as above) - continue smoking cessation\par Cardiology\par essential hypertension - check EKG (results as above) - continue Lisinopril 10mg p.o.q.d. - advised low sodium diet and weight loss; will continue to monitor BP \par hyperlipidemia - continue Simvastatin 40mg p.o.q.d. - advised low cholesterol/low fat diet - check FLP and LFTs\par continue Aspirin 81mg p.o.q.d. \par Endocrinology\par diabetes - continue Janumet 50-1000mg BID p.o.q.d. with meals as directed, Jardiance 25mg p.o.q.d. as directed, and Glipizide ER 10mg p.o.q.d. as directed; continue Lisinopril 10mg p.o.q.d. as directed; continue Simvastatin 40mg p.o.q.d. as directed - advised low carbohydrate/low sugar diet - check hemoglobin A1C and urine microalbumin/creatinine ratio\par obesity - advised low carbohydrate diet and increased CV exercise\par Gastroenterology\par Results of last screening colonoscopy to be requested from gastroenterologist's office \par Podiatry\par Follow up with podiatrist, Dr. Callahan annually for diabetic feet examination \par Immunization\par Shingrix - discussed, patient to determine if vaccine is covered under medical benefits of current insurance plan and follow up for 1st dose if interested; otherwise, instructed to follow up at the pharmacy for vaccine administration\par Immunization/Infectious Disease\par check MMR IgG Antibody; check Varicella Zoster IgG Antibody\par check Hepatitis B Surface Antibody; discussed following up for booster vaccine pending results of blood work\par PPD Tuberculin Skin Test - 0.1mL x 1 administered intradermally to right forearm; advised to follow up in 48-72 hours for reading\par \par check male panel, hemoglobin A1C, urine microalbumin/creatinine ratio, MMR IgG Antibody, Varicella Zoster IgG Antibody, and Hepatitis B Surface Antibody\par Medical forms completed and returned to patient.

## 2021-06-07 NOTE — HEALTH RISK ASSESSMENT
[Yes] : Yes [Monthly or less (1 pt)] : Monthly or less (1 point) [1 or 2 (0 pts)] : 1 or 2 (0 points) [Never (0 pts)] : Never (0 points) [No] : In the past 12 months have you used drugs other than those required for medical reasons? No [0] : 2) Feeling down, depressed, or hopeless: Not at all (0) [] : No [Audit-CScore] : 1 [[id number]] : 0 [ColonoscopyComments] : Results to be requested.

## 2021-06-07 NOTE — ADDENDUM
[FreeTextEntry1] : I, Eric Pandya, acted solely as scribe for Dr. Aidan Julio DO on this date 06/07/2021 10:40AM .\par \par All medical record entries made by the Scribe were at my, Dr. Aidan Julio DO direction and personally dictated by me on 06/07/2021 10:40AM. I have reviewed the chart and agree that the record accurately reflects my personal performance of the history, physical exam, assessment and plan. I have also personally directed, reviewed and agreed with the chart.\par

## 2021-06-10 ENCOUNTER — NON-APPOINTMENT (OUTPATIENT)
Age: 61
End: 2021-06-10

## 2021-06-10 LAB
25(OH)D3 SERPL-MCNC: 74.9 NG/ML
ALBUMIN SERPL ELPH-MCNC: 4.9 G/DL
ALP BLD-CCNC: 83 U/L
ALT SERPL-CCNC: 41 U/L
ANION GAP SERPL CALC-SCNC: 14 MMOL/L
AST SERPL-CCNC: 21 U/L
BASOPHILS # BLD AUTO: 0.05 K/UL
BASOPHILS NFR BLD AUTO: 0.6 %
BILIRUB SERPL-MCNC: 0.4 MG/DL
BUN SERPL-MCNC: 18 MG/DL
CALCIUM SERPL-MCNC: 10.5 MG/DL
CHLORIDE SERPL-SCNC: 101 MMOL/L
CHOLEST SERPL-MCNC: 158 MG/DL
CO2 SERPL-SCNC: 25 MMOL/L
CREAT SERPL-MCNC: 1 MG/DL
CREAT SPEC-SCNC: 73 MG/DL
EOSINOPHIL # BLD AUTO: 0.45 K/UL
EOSINOPHIL NFR BLD AUTO: 5.5 %
ESTIMATED AVERAGE GLUCOSE: 169 MG/DL
GLUCOSE SERPL-MCNC: 154 MG/DL
HBA1C MFR BLD HPLC: 7.5 %
HBV SURFACE AB SER QL: ABNORMAL
HCT VFR BLD CALC: 51.3 %
HDLC SERPL-MCNC: 40 MG/DL
HGB BLD-MCNC: 16.4 G/DL
IMM GRANULOCYTES NFR BLD AUTO: 0.4 %
LDLC SERPL CALC-MCNC: 92 MG/DL
LYMPHOCYTES # BLD AUTO: 3.25 K/UL
LYMPHOCYTES NFR BLD AUTO: 39.7 %
MAN DIFF?: NORMAL
MCHC RBC-ENTMCNC: 31.1 PG
MCHC RBC-ENTMCNC: 32 GM/DL
MCV RBC AUTO: 97.3 FL
MICROALBUMIN 24H UR DL<=1MG/L-MCNC: <1.2 MG/DL
MICROALBUMIN/CREAT 24H UR-RTO: NORMAL MG/G
MONOCYTES # BLD AUTO: 0.59 K/UL
MONOCYTES NFR BLD AUTO: 7.2 %
NEUTROPHILS # BLD AUTO: 3.82 K/UL
NEUTROPHILS NFR BLD AUTO: 46.6 %
NONHDLC SERPL-MCNC: 118 MG/DL
PLATELET # BLD AUTO: 261 K/UL
POTASSIUM SERPL-SCNC: 5.3 MMOL/L
PROT SERPL-MCNC: 7.1 G/DL
PSA SERPL-MCNC: 0.54 NG/ML
RBC # BLD: 5.27 M/UL
RBC # FLD: 12.5 %
SODIUM SERPL-SCNC: 141 MMOL/L
TRIGL SERPL-MCNC: 127 MG/DL
TSH SERPL-ACNC: 2.17 UIU/ML
VZV AB TITR SER: POSITIVE
VZV IGG SER IF-ACNC: 862.9 INDEX
WBC # FLD AUTO: 8.19 K/UL

## 2021-07-09 ENCOUNTER — RX RENEWAL (OUTPATIENT)
Age: 61
End: 2021-07-09

## 2021-07-16 ENCOUNTER — RX RENEWAL (OUTPATIENT)
Age: 61
End: 2021-07-16

## 2021-08-10 ENCOUNTER — RX RENEWAL (OUTPATIENT)
Age: 61
End: 2021-08-10

## 2021-09-05 ENCOUNTER — RX RENEWAL (OUTPATIENT)
Age: 61
End: 2021-09-05

## 2021-09-07 ENCOUNTER — RX RENEWAL (OUTPATIENT)
Age: 61
End: 2021-09-07

## 2021-10-06 ENCOUNTER — RX RENEWAL (OUTPATIENT)
Age: 61
End: 2021-10-06

## 2021-10-08 ENCOUNTER — RX RENEWAL (OUTPATIENT)
Age: 61
End: 2021-10-08

## 2021-11-12 ENCOUNTER — APPOINTMENT (OUTPATIENT)
Dept: INTERNAL MEDICINE | Facility: CLINIC | Age: 61
End: 2021-11-12
Payer: COMMERCIAL

## 2021-11-12 VITALS
HEIGHT: 77 IN | DIASTOLIC BLOOD PRESSURE: 72 MMHG | BODY MASS INDEX: 32.14 KG/M2 | WEIGHT: 272.2 LBS | RESPIRATION RATE: 16 BRPM | SYSTOLIC BLOOD PRESSURE: 118 MMHG | TEMPERATURE: 97 F | OXYGEN SATURATION: 96 % | HEART RATE: 59 BPM

## 2021-11-12 DIAGNOSIS — Z23 ENCOUNTER FOR IMMUNIZATION: ICD-10-CM

## 2021-11-12 PROCEDURE — G0008: CPT

## 2021-11-12 PROCEDURE — 36415 COLL VENOUS BLD VENIPUNCTURE: CPT

## 2021-11-12 PROCEDURE — 90686 IIV4 VACC NO PRSV 0.5 ML IM: CPT

## 2021-11-12 PROCEDURE — 99214 OFFICE O/P EST MOD 30 MIN: CPT | Mod: 25

## 2021-11-12 NOTE — ASSESSMENT
[FreeTextEntry1] : Patient is a 60 year old male with a past medical history as above who presents for fasting blood work, Rx refill, and general follow-up.\par

## 2021-11-12 NOTE — HEALTH RISK ASSESSMENT
[Yes] : Yes [Monthly or less (1 pt)] : Monthly or less (1 point) [1 or 2 (0 pts)] : 1 or 2 (0 points) [Never (0 pts)] : Never (0 points) [No] : In the past 12 months have you used drugs other than those required for medical reasons? No [0] : 2) Feeling down, depressed, or hopeless: Not at all (0) [PHQ-2 Negative - No further assessment needed] : PHQ-2 Negative - No further assessment needed [] : No [Audit-CScore] : 1 [[id number]] : 0 [ColonoscopyComments] : Results to be requested.

## 2021-11-12 NOTE — ADDENDUM
[FreeTextEntry1] : I, Eric Pandya, acted solely as scribe for Dr. Aidan Julio DO on this date 11/12/2021 10:10AM .\par \par All medical record entries made by the Scribe were at my, Dr. Aidan Julio DO direction and personally dictated by me on 11/12/2021 10:10AM. I have reviewed the chart and agree that the record accurately reflects my personal performance of the history, physical exam, assessment and plan. I have also personally directed, reviewed and agreed with the chart.\par

## 2021-11-12 NOTE — PLAN
[FreeTextEntry1] : Ophthalmology\par Previously advised to follow up with ophthalmologist, Dr. Rodriguez for annual diabetic eye exam; results to be requested\par Cardiology\par essential hypertension - continue Lisinopril 10mg p.o.q.d. as directed, Rx filled - continue low sodium diet and weight loss; will continue to monitor BP \par hyperlipidemia - continue Simvastatin 40mg p.o.q.d. as directed, Rx filled - continue low cholesterol/low fat diet - check FLP and LFTs\par Endocrinology\par diabetes - continue Janumet 50-1000mg BID p.o.q.d. with meals as directed, Rx filled, Jardiance 25mg p.o.q.d. as directed, Rx filled, and Glipizide ER 10mg p.o.q.d. as directed, Rx filled; continue Lisinopril 10mg p.o.q.d. as directed, Rx filled; continue Simvastatin 40mg p.o.q.d. as directed, Rx filled - continue low carbohydrate/low sugar diet - check hemoglobin A1C and Urine Microalbumin/Creatinine Ratio\par obesity - continue low carbohydrate diet; previously recommended increasing CV exercise\par history of Vitamin D deficiency - check Vitamin D \par Gastroenterology\par abdominal pain, nausea, and vomiting - likely secondary to resolving viral gastroenteritis - recommended increasing hydration; advised to follow up if symptoms persist/worsen \par Results of last screening colonoscopy to be requested from gastroenterologist's office \par Podiatry\par Continue to follow up with podiatrist, Dr. Callahan annually for diabetic feet examination \par Immunization\par flu vaccine - Fluarix Quadrivalent 0.5mL x 1 administered intramuscularly to left deltoid \par Shingrix - previously discussed, patient to determine if vaccine is covered under medical benefits of current insurance plan and follow up for 1st dose if interested; otherwise, instructed to follow up at the pharmacy for vaccine administration\par \par check CBC, CMP, FLP, hemoglobin A1C, Vitamin D, and Urine Microalbumin/Creatinine Ratio

## 2021-11-12 NOTE — HISTORY OF PRESENT ILLNESS
[FreeTextEntry1] : fasting blood work, Rx refill, and general follow-up\par  [de-identified] : Patient is a 60 year old male with a past medical history as below who presents for fasting blood work, Rx refill, and general follow-up. Patient states he is taking all medications as prescribed and denies any adverse reactions or side effects. He denies lightheadedness or dizziness on Lisinopril. He denies diffuse arthralgias or myalgias on Simvastatin. He denies any recent episodes of hypoglycemia. Patient noted intermittent abdominal pain, nausea, and vomiting on 11/9/21 and 11/10/21. He denies fever, chills, or cough. He denies having any sick contacts. Patient inquires about receiving the flu vaccine today. Patient requests Rx refill for all medications.

## 2021-11-12 NOTE — PHYSICAL EXAM
[No Acute Distress] : no acute distress [Well Nourished] : well nourished [Well Developed] : well developed [Well-Appearing] : well-appearing [Normal Sclera/Conjunctiva] : normal sclera/conjunctiva [PERRL] : pupils equal round and reactive to light [EOMI] : extraocular movements intact [Normal Outer Ear/Nose] : the outer ears and nose were normal in appearance [Normal Oropharynx] : the oropharynx was normal [No JVD] : no jugular venous distention [No Lymphadenopathy] : no lymphadenopathy [Supple] : supple [Thyroid Normal, No Nodules] : the thyroid was normal and there were no nodules present [No Respiratory Distress] : no respiratory distress  [No Accessory Muscle Use] : no accessory muscle use [Clear to Auscultation] : lungs were clear to auscultation bilaterally [Normal Rate] : normal rate  [Regular Rhythm] : with a regular rhythm [Normal S1, S2] : normal S1 and S2 [No Murmur] : no murmur heard [No Carotid Bruits] : no carotid bruits [No Abdominal Bruit] : a ~M bruit was not heard ~T in the abdomen [No Varicosities] : no varicosities [Pedal Pulses Present] : the pedal pulses are present [No Edema] : there was no peripheral edema [No Palpable Aorta] : no palpable aorta [No Extremity Clubbing/Cyanosis] : no extremity clubbing/cyanosis [Soft] : abdomen soft [Non Tender] : non-tender [Non-distended] : non-distended [No Masses] : no abdominal mass palpated [No HSM] : no HSM [Normal Bowel Sounds] : normal bowel sounds [Normal Posterior Cervical Nodes] : no posterior cervical lymphadenopathy [Normal Anterior Cervical Nodes] : no anterior cervical lymphadenopathy [No CVA Tenderness] : no CVA  tenderness [No Spinal Tenderness] : no spinal tenderness [No Joint Swelling] : no joint swelling [Grossly Normal Strength/Tone] : grossly normal strength/tone [No Rash] : no rash [Coordination Grossly Intact] : coordination grossly intact [No Focal Deficits] : no focal deficits [Normal Gait] : normal gait [Deep Tendon Reflexes (DTR)] : deep tendon reflexes were 2+ and symmetric [Normal Affect] : the affect was normal [Normal Insight/Judgement] : insight and judgment were intact [de-identified] : obese

## 2021-11-20 ENCOUNTER — NON-APPOINTMENT (OUTPATIENT)
Age: 61
End: 2021-11-20

## 2021-11-20 LAB
25(OH)D3 SERPL-MCNC: 66.2 NG/ML
ALBUMIN SERPL ELPH-MCNC: 4.8 G/DL
ALP BLD-CCNC: 78 U/L
ALT SERPL-CCNC: 45 U/L
ANION GAP SERPL CALC-SCNC: 16 MMOL/L
AST SERPL-CCNC: 30 U/L
BASOPHILS # BLD AUTO: 0.05 K/UL
BASOPHILS NFR BLD AUTO: 0.4 %
BILIRUB SERPL-MCNC: 0.6 MG/DL
BUN SERPL-MCNC: 16 MG/DL
CALCIUM SERPL-MCNC: 9.5 MG/DL
CHLORIDE SERPL-SCNC: 100 MMOL/L
CHOLEST SERPL-MCNC: 154 MG/DL
CO2 SERPL-SCNC: 22 MMOL/L
CREAT SERPL-MCNC: 0.9 MG/DL
CREAT SPEC-SCNC: 119 MG/DL
EOSINOPHIL # BLD AUTO: 0.41 K/UL
EOSINOPHIL NFR BLD AUTO: 3.6 %
ESTIMATED AVERAGE GLUCOSE: 186 MG/DL
GLUCOSE SERPL-MCNC: 158 MG/DL
HBA1C MFR BLD HPLC: 8.1 %
HCT VFR BLD CALC: 55.1 %
HDLC SERPL-MCNC: 38 MG/DL
HGB BLD-MCNC: 17.1 G/DL
IMM GRANULOCYTES NFR BLD AUTO: 0.3 %
LDLC SERPL CALC-MCNC: 79 MG/DL
LYMPHOCYTES # BLD AUTO: 3.37 K/UL
LYMPHOCYTES NFR BLD AUTO: 29.9 %
MAN DIFF?: NORMAL
MCHC RBC-ENTMCNC: 31 GM/DL
MCHC RBC-ENTMCNC: 31.1 PG
MCV RBC AUTO: 100.2 FL
MICROALBUMIN 24H UR DL<=1MG/L-MCNC: <1.2 MG/DL
MICROALBUMIN/CREAT 24H UR-RTO: NORMAL MG/G
MONOCYTES # BLD AUTO: 0.84 K/UL
MONOCYTES NFR BLD AUTO: 7.5 %
NEUTROPHILS # BLD AUTO: 6.56 K/UL
NEUTROPHILS NFR BLD AUTO: 58.3 %
NONHDLC SERPL-MCNC: 115 MG/DL
PLATELET # BLD AUTO: 311 K/UL
POTASSIUM SERPL-SCNC: 4.9 MMOL/L
PROT SERPL-MCNC: 7.1 G/DL
RBC # BLD: 5.5 M/UL
RBC # FLD: 12.7 %
SODIUM SERPL-SCNC: 138 MMOL/L
TRIGL SERPL-MCNC: 181 MG/DL
WBC # FLD AUTO: 11.26 K/UL

## 2021-12-01 DIAGNOSIS — D72.829 ELEVATED WHITE BLOOD CELL COUNT, UNSPECIFIED: ICD-10-CM

## 2021-12-20 ENCOUNTER — RX RENEWAL (OUTPATIENT)
Age: 61
End: 2021-12-20

## 2022-02-02 ENCOUNTER — RX RENEWAL (OUTPATIENT)
Age: 62
End: 2022-02-02

## 2022-02-14 ENCOUNTER — NON-APPOINTMENT (OUTPATIENT)
Age: 62
End: 2022-02-14

## 2022-03-10 ENCOUNTER — RX RENEWAL (OUTPATIENT)
Age: 62
End: 2022-03-10

## 2022-03-11 RX ORDER — SITAGLIPTIN AND METFORMIN HYDROCHLORIDE 50; 1000 MG/1; MG/1
50-1000 TABLET, FILM COATED ORAL
Qty: 180 | Refills: 0 | Status: DISCONTINUED | COMMUNITY
Start: 2019-04-15 | End: 2022-03-11

## 2022-03-14 ENCOUNTER — NON-APPOINTMENT (OUTPATIENT)
Age: 62
End: 2022-03-14

## 2022-03-14 ENCOUNTER — APPOINTMENT (OUTPATIENT)
Dept: ORTHOPEDIC SURGERY | Facility: CLINIC | Age: 62
End: 2022-03-14
Payer: COMMERCIAL

## 2022-03-14 VITALS
HEART RATE: 64 BPM | WEIGHT: 270 LBS | SYSTOLIC BLOOD PRESSURE: 136 MMHG | HEIGHT: 77 IN | BODY MASS INDEX: 31.88 KG/M2 | DIASTOLIC BLOOD PRESSURE: 73 MMHG

## 2022-03-14 DIAGNOSIS — M43.16 SPONDYLOLISTHESIS, LUMBAR REGION: ICD-10-CM

## 2022-03-14 DIAGNOSIS — M47.816 SPONDYLOSIS W/OUT MYELOPATHY OR RADICULOPATHY, LUMBAR REGION: ICD-10-CM

## 2022-03-14 DIAGNOSIS — M17.12 UNILATERAL PRIMARY OSTEOARTHRITIS, LEFT KNEE: ICD-10-CM

## 2022-03-14 DIAGNOSIS — M16.0 BILATERAL PRIMARY OSTEOARTHRITIS OF HIP: ICD-10-CM

## 2022-03-14 PROCEDURE — 99203 OFFICE O/P NEW LOW 30 MIN: CPT

## 2022-03-14 PROCEDURE — 73562 X-RAY EXAM OF KNEE 3: CPT | Mod: LT

## 2022-03-14 PROCEDURE — 72100 X-RAY EXAM L-S SPINE 2/3 VWS: CPT

## 2022-03-14 PROCEDURE — 72170 X-RAY EXAM OF PELVIS: CPT

## 2022-04-08 ENCOUNTER — RX RENEWAL (OUTPATIENT)
Age: 62
End: 2022-04-08

## 2022-04-12 RX ORDER — GLIPIZIDE 10 MG/1
10 TABLET, FILM COATED, EXTENDED RELEASE ORAL
Qty: 30 | Refills: 2 | Status: COMPLETED | COMMUNITY
Start: 2020-05-26 | End: 2022-04-12

## 2022-05-02 ENCOUNTER — APPOINTMENT (OUTPATIENT)
Dept: ORTHOPEDIC SURGERY | Facility: CLINIC | Age: 62
End: 2022-05-02
Payer: COMMERCIAL

## 2022-05-02 DIAGNOSIS — M65.9 SYNOVITIS AND TENOSYNOVITIS, UNSPECIFIED: ICD-10-CM

## 2022-05-02 DIAGNOSIS — M25.562 PAIN IN LEFT KNEE: ICD-10-CM

## 2022-05-02 PROCEDURE — 99213 OFFICE O/P EST LOW 20 MIN: CPT

## 2022-05-10 RX ORDER — EMPAGLIFLOZIN 25 MG/1
25 TABLET, FILM COATED ORAL
Qty: 30 | Refills: 2 | Status: DISCONTINUED | COMMUNITY
Start: 2019-04-19 | End: 2022-05-09

## 2022-06-11 ENCOUNTER — RX RENEWAL (OUTPATIENT)
Age: 62
End: 2022-06-11

## 2022-07-04 ENCOUNTER — RX RENEWAL (OUTPATIENT)
Age: 62
End: 2022-07-04

## 2022-07-22 ENCOUNTER — APPOINTMENT (OUTPATIENT)
Dept: INTERNAL MEDICINE | Facility: CLINIC | Age: 62
End: 2022-07-22

## 2022-07-22 VITALS
DIASTOLIC BLOOD PRESSURE: 80 MMHG | HEIGHT: 77 IN | WEIGHT: 269 LBS | OXYGEN SATURATION: 98 % | BODY MASS INDEX: 31.76 KG/M2 | TEMPERATURE: 98 F | RESPIRATION RATE: 14 BRPM | HEART RATE: 67 BPM | SYSTOLIC BLOOD PRESSURE: 130 MMHG

## 2022-07-22 DIAGNOSIS — R05.9 COUGH, UNSPECIFIED: ICD-10-CM

## 2022-07-22 PROCEDURE — 36415 COLL VENOUS BLD VENIPUNCTURE: CPT

## 2022-07-22 PROCEDURE — 99214 OFFICE O/P EST MOD 30 MIN: CPT | Mod: 25

## 2022-07-22 NOTE — HEALTH RISK ASSESSMENT
[Former] : Former [Yes] : Yes [Monthly or less (1 pt)] : Monthly or less (1 point) [1 or 2 (0 pts)] : 1 or 2 (0 points) [Never (0 pts)] : Never (0 points) [No] : In the past 12 months have you used drugs other than those required for medical reasons? No [No falls in past year] : Patient reported no falls in the past year [0] : 2) Feeling down, depressed, or hopeless: Not at all (0) [PHQ-2 Negative - No further assessment needed] : PHQ-2 Negative - No further assessment needed [Fully functional (bathing, dressing, toileting, transferring, walking, feeding)] : Fully functional (bathing, dressing, toileting, transferring, walking, feeding) [Fully functional (using the telephone, shopping, preparing meals, housekeeping, doing laundry, using] : Fully functional and needs no help or supervision to perform IADLs (using the telephone, shopping, preparing meals, housekeeping, doing laundry, using transportation, managing medications and managing finances) [Audit-CScore] : 1 [[id number]] : 0 [ColonoscopyComments] : Results to be requested.

## 2022-07-22 NOTE — PHYSICAL EXAM
[No Acute Distress] : no acute distress [Well Nourished] : well nourished [Well Developed] : well developed [Well-Appearing] : well-appearing [Normal Sclera/Conjunctiva] : normal sclera/conjunctiva [PERRL] : pupils equal round and reactive to light [EOMI] : extraocular movements intact [Normal Outer Ear/Nose] : the outer ears and nose were normal in appearance [Normal Oropharynx] : the oropharynx was normal [No JVD] : no jugular venous distention [No Lymphadenopathy] : no lymphadenopathy [Supple] : supple [Thyroid Normal, No Nodules] : the thyroid was normal and there were no nodules present [No Respiratory Distress] : no respiratory distress  [No Accessory Muscle Use] : no accessory muscle use [Clear to Auscultation] : lungs were clear to auscultation bilaterally [Normal Rate] : normal rate  [Regular Rhythm] : with a regular rhythm [Normal S1, S2] : normal S1 and S2 [No Murmur] : no murmur heard [No Carotid Bruits] : no carotid bruits [No Abdominal Bruit] : a ~M bruit was not heard ~T in the abdomen [No Varicosities] : no varicosities [Pedal Pulses Present] : the pedal pulses are present [No Edema] : there was no peripheral edema [No Palpable Aorta] : no palpable aorta [No Extremity Clubbing/Cyanosis] : no extremity clubbing/cyanosis [Soft] : abdomen soft [Non Tender] : non-tender [Non-distended] : non-distended [No Masses] : no abdominal mass palpated [No HSM] : no HSM [Normal Bowel Sounds] : normal bowel sounds [Normal Posterior Cervical Nodes] : no posterior cervical lymphadenopathy [Normal Anterior Cervical Nodes] : no anterior cervical lymphadenopathy [No CVA Tenderness] : no CVA  tenderness [No Spinal Tenderness] : no spinal tenderness [No Joint Swelling] : no joint swelling [Grossly Normal Strength/Tone] : grossly normal strength/tone [No Rash] : no rash [Coordination Grossly Intact] : coordination grossly intact [No Focal Deficits] : no focal deficits [Normal Gait] : normal gait [Deep Tendon Reflexes (DTR)] : deep tendon reflexes were 2+ and symmetric [Normal Affect] : the affect was normal [Normal Insight/Judgement] : insight and judgment were intact [Normal Voice/Communication] : normal voice/communication [Normal TMs] : both tympanic membranes were normal [Normal Supraclavicular Nodes] : no supraclavicular lymphadenopathy [Kyphosis] : no kyphosis [Scoliosis] : no scoliosis [Speech Grossly Normal] : speech grossly normal [Memory Grossly Normal] : memory grossly normal [Alert and Oriented x3] : oriented to person, place, and time [Normal Mood] : the mood was normal [de-identified] : obese

## 2022-07-22 NOTE — HISTORY OF PRESENT ILLNESS
[FreeTextEntry1] : fasting blood work and general follow-up [de-identified] : Patient is a 61 year old male with a past medical history as below who presents for fasting blood work and general follow-up. Patient states he is taking all medications as prescribed and denies any adverse reactions or side effects. He denies lightheadedness/dizziness on Lisinopril. He denies diffuse arthralgias/myalgias on Simvastatin. He denies any recent episodes of hypoglycemia. Blood-glucose readings via fingerstick have ranged from 130-170. Patient inquires about a referral to a pulmonologist. He had noted SOB. He had also noted a dry cough; now resolved. Patient has noted increased stress secondary to the passing of his cat.

## 2022-07-22 NOTE — REVIEW OF SYSTEMS
[Shortness Of Breath] : shortness of breath [Cough] : cough [Negative] : Heme/Lymph [de-identified] : increased stress

## 2022-07-22 NOTE — ADDENDUM
[FreeTextEntry1] : I, Eric Pandya, acted solely as scribe for Dr. Aidan Julio DO on this date 07/22/2022  2:50PM .\par \par All medical record entries made by the Scribe were at my, Dr. Aidan Julio DO direction and personally dictated by me on 07/22/2022  2:50PM. I have reviewed the chart and agree that the record accurately reflects my personal performance of the history, physical exam, assessment and plan. I have also personally directed, reviewed and agreed with the chart.

## 2022-07-22 NOTE — PLAN
[FreeTextEntry1] : Ophthalmology\par Previously advised to follow up with ophthalmologist, Dr. Rodriguez for annual diabetic eye exam; results to be requested\par Pulmonary\par SOB/cough - referred to pulmonologist, Dr. Chris for further evaluation \par Cardiology\par essential hypertension - continue Lisinopril 10mg p.o.q.d. as directed - continue low sodium diet and weight loss; will continue to monitor BP \par hyperlipidemia - continue Simvastatin 40mg p.o.q.d. as directed - continue low cholesterol/low fat diet - check FLP and LFTs\par hypertriglyceridemia - continue low cholesterol/low fat and low carbohydrate/low sugar diet - check FLP \par Endocrinology\par diabetes - continue Metformin HCl 1000mg BID p.o.q.d. as directed, Januvia 100mg p.o.q.d. as directed, Jardiance 25mg p.o.q.d. as directed, Farxiga 10mg p.o.q.d. as directed, and Glipizide ER 10mg BID p.o.q.d. as directed; continue Lisinopril 10mg p.o.q.d. as directed; continue Simvastatin 40mg p.o.q.d. as directed - continue low carbohydrate/low sugar diet - check hemoglobin A1C and Urine Albumin to Creatinine Ratio\par obesity - continue low carbohydrate diet; previously recommended increasing CV exercise\par history of Vitamin D deficiency - check Vitamin D \par Gastroenterology\par Results of last screening colonoscopy to be requested from GI's office \par Podiatry\par Continue to follow up with podiatrist, Dr. Callahan annually for diabetic feet examination \par Hematology\par leukocytosis - check WBC\par Immunization\par Shingrix - previously discussed, patient to determine if vaccine is covered under medical benefits of current insurance plan and follow up for 1st dose if interested; otherwise, instructed to follow up at the pharmacy for vaccine administration\par \par check CBC, CMP, FLP, hemoglobin A1C PSA, Vitamin D, and Urine Albumin/Creatinine Ratio

## 2022-07-22 NOTE — ASSESSMENT
[FreeTextEntry1] : Patient is a 61 year old male with a past medical history as above who presents for fasting blood work and general follow-up.

## 2022-07-23 LAB
25(OH)D3 SERPL-MCNC: 64.1 NG/ML
ALBUMIN SERPL ELPH-MCNC: 4.8 G/DL
ALP BLD-CCNC: 67 U/L
ALT SERPL-CCNC: 52 U/L
ANION GAP SERPL CALC-SCNC: 12 MMOL/L
AST SERPL-CCNC: 34 U/L
BASOPHILS # BLD AUTO: 0.05 K/UL
BASOPHILS NFR BLD AUTO: 0.5 %
BILIRUB SERPL-MCNC: 0.6 MG/DL
BUN SERPL-MCNC: 16 MG/DL
CALCIUM SERPL-MCNC: 10.2 MG/DL
CHLORIDE SERPL-SCNC: 101 MMOL/L
CHOLEST SERPL-MCNC: 179 MG/DL
CO2 SERPL-SCNC: 26 MMOL/L
CREAT SERPL-MCNC: 0.85 MG/DL
CREAT SPEC-SCNC: 151 MG/DL
EGFR: 98 ML/MIN/1.73M2
EOSINOPHIL # BLD AUTO: 0.35 K/UL
EOSINOPHIL NFR BLD AUTO: 3.5 %
ESTIMATED AVERAGE GLUCOSE: 197 MG/DL
GLUCOSE SERPL-MCNC: 134 MG/DL
HBA1C MFR BLD HPLC: 8.5 %
HCT VFR BLD CALC: 50.4 %
HDLC SERPL-MCNC: 44 MG/DL
HGB BLD-MCNC: 16.6 G/DL
IMM GRANULOCYTES NFR BLD AUTO: 0.4 %
LDLC SERPL CALC-MCNC: 98 MG/DL
LYMPHOCYTES # BLD AUTO: 3.23 K/UL
LYMPHOCYTES NFR BLD AUTO: 32 %
MAN DIFF?: NORMAL
MCHC RBC-ENTMCNC: 31.7 PG
MCHC RBC-ENTMCNC: 32.9 GM/DL
MCV RBC AUTO: 96.2 FL
MICROALBUMIN 24H UR DL<=1MG/L-MCNC: 1.9 MG/DL
MICROALBUMIN/CREAT 24H UR-RTO: 12 MG/G
MONOCYTES # BLD AUTO: 0.67 K/UL
MONOCYTES NFR BLD AUTO: 6.6 %
NEUTROPHILS # BLD AUTO: 5.76 K/UL
NEUTROPHILS NFR BLD AUTO: 57 %
NONHDLC SERPL-MCNC: 135 MG/DL
PLATELET # BLD AUTO: 284 K/UL
POTASSIUM SERPL-SCNC: 4.7 MMOL/L
PROT SERPL-MCNC: 7.1 G/DL
PSA SERPL-MCNC: 0.41 NG/ML
RBC # BLD: 5.24 M/UL
RBC # FLD: 12.3 %
SODIUM SERPL-SCNC: 138 MMOL/L
TRIGL SERPL-MCNC: 185 MG/DL
WBC # FLD AUTO: 10.1 K/UL

## 2022-07-25 ENCOUNTER — NON-APPOINTMENT (OUTPATIENT)
Age: 62
End: 2022-07-25

## 2022-08-01 ENCOUNTER — RX RENEWAL (OUTPATIENT)
Age: 62
End: 2022-08-01

## 2022-08-30 ENCOUNTER — RX RENEWAL (OUTPATIENT)
Age: 62
End: 2022-08-30

## 2022-10-01 ENCOUNTER — RX RENEWAL (OUTPATIENT)
Age: 62
End: 2022-10-01

## 2022-10-02 ENCOUNTER — RX RENEWAL (OUTPATIENT)
Age: 62
End: 2022-10-02

## 2022-11-01 ENCOUNTER — RX RENEWAL (OUTPATIENT)
Age: 62
End: 2022-11-01

## 2022-11-07 ENCOUNTER — RX RENEWAL (OUTPATIENT)
Age: 62
End: 2022-11-07

## 2022-12-04 ENCOUNTER — RX RENEWAL (OUTPATIENT)
Age: 62
End: 2022-12-04

## 2022-12-07 ENCOUNTER — NON-APPOINTMENT (OUTPATIENT)
Age: 62
End: 2022-12-07

## 2022-12-27 ENCOUNTER — APPOINTMENT (OUTPATIENT)
Dept: INTERNAL MEDICINE | Facility: CLINIC | Age: 62
End: 2022-12-27
Payer: COMMERCIAL

## 2022-12-27 VITALS
BODY MASS INDEX: 31.53 KG/M2 | SYSTOLIC BLOOD PRESSURE: 132 MMHG | DIASTOLIC BLOOD PRESSURE: 78 MMHG | HEIGHT: 77 IN | TEMPERATURE: 97.3 F | HEART RATE: 65 BPM | RESPIRATION RATE: 16 BRPM | WEIGHT: 267 LBS | OXYGEN SATURATION: 95 %

## 2022-12-27 DIAGNOSIS — R05.9 COUGH, UNSPECIFIED: ICD-10-CM

## 2022-12-27 PROCEDURE — 36415 COLL VENOUS BLD VENIPUNCTURE: CPT

## 2022-12-27 PROCEDURE — 99214 OFFICE O/P EST MOD 30 MIN: CPT | Mod: 25

## 2022-12-27 RX ORDER — PREDNISONE 10 MG/1
10 TABLET ORAL
Qty: 9 | Refills: 0 | Status: DISCONTINUED | COMMUNITY
Start: 2022-12-12

## 2022-12-27 RX ORDER — PREDNISONE 20 MG/1
20 TABLET ORAL
Qty: 20 | Refills: 0 | Status: DISCONTINUED | COMMUNITY
Start: 2022-12-08

## 2022-12-27 RX ORDER — FLUTICASONE FUROATE AND VILANTEROL TRIFENATATE 100; 25 UG/1; UG/1
100-25 POWDER RESPIRATORY (INHALATION)
Qty: 60 | Refills: 0 | Status: ACTIVE | COMMUNITY
Start: 2022-12-15

## 2022-12-27 NOTE — HISTORY OF PRESENT ILLNESS
[FreeTextEntry1] : fasting blood work, Rx refill, and general follow-up [de-identified] : Patient is a 62 year old male with a past medical history as below who presents for fasting blood work, Rx refill, and general follow-up. \par \par Patient states he is taking all medications as prescribed and denies any adverse reactions or side effects. He denies lightheadedness or dizziness on Lisinopril. He denies diffuse arthralgias or myalgias on Simvastatin. He denies any gastrointestinal issues on Metformin. He denies any recent episodes of hypoglycemia.\par \par Patient is due to see ophthalmologist, Dr. Rodriguez for his annual diabetic eye exam. \par \par Patient has been trying to maintain a well-balanced diet.\par \par Patient received the flu vaccine 2 weeks ago. He received the Pneumovax 23 on 9/21/20. He received the Tdap (Adacel) Vaccine on 6/20/20. He has not received the Shingles (Shingrix) Vaccine. Patient states he has received the bivalent COVID-19 vaccine booster. He notes history of COVID-19 infection.\par \par Patient notes recent history of cough productive of mucus (yellow/white; no hemoptysis) and wheezing; s/p course of Doxycycline and Prednisone (prescribed by pulmonologist, Dr. Nuñez). He was also prescribed Breo Ellipta and Albuterol Sulfate, but never started the Albuterol.  \par \par Patient requests Rx refill for all medications.

## 2022-12-27 NOTE — HEALTH RISK ASSESSMENT
[Former] : Former [Yes] : Yes [Monthly or less (1 pt)] : Monthly or less (1 point) [1 or 2 (0 pts)] : 1 or 2 (0 points) [Never (0 pts)] : Never (0 points) [No] : In the past 12 months have you used drugs other than those required for medical reasons? No [No falls in past year] : Patient reported no falls in the past year [0] : 2) Feeling down, depressed, or hopeless: Not at all (0) [PHQ-2 Negative - No further assessment needed] : PHQ-2 Negative - No further assessment needed [Fully functional (bathing, dressing, toileting, transferring, walking, feeding)] : Fully functional (bathing, dressing, toileting, transferring, walking, feeding) [Fully functional (using the telephone, shopping, preparing meals, housekeeping, doing laundry, using] : Fully functional and needs no help or supervision to perform IADLs (using the telephone, shopping, preparing meals, housekeeping, doing laundry, using transportation, managing medications and managing finances) [Audit-CScore] : 1 [[id number]] : 0 [ColonoscopyComments] : Results to be requested.

## 2022-12-27 NOTE — PLAN
[FreeTextEntry1] : Ophthalmology\par Again advised to follow up with ophthalmologist, Dr. Rodriguez for annual diabetic eye exam; results to be requested\par Pulmonary\par Continue Breo Ellipta 100-25 MCG/ACT as directed; continue to follow up with pulmonologist, Dr. Nuñez\par Cardiovascular\par essential hypertension - continue Lisinopril 10mg p.o.q.d. as directed, Rx filled - continue low sodium diet and weight loss; will continue to monitor BP \par hyperlipidemia - continue Simvastatin 40mg p.o.q.d. as directed, Rx filled - continue low cholesterol/low fat diet - check FLP and LFTs\par hypertriglyceridemia - continue low cholesterol/low fat and low carbohydrate/low sugar diet - check FLP \par Continue Aspirin 81mg p.o.q.d. as directed\par Endocrinology\par diabetes - continue Metformin HCl 1000mg BID p.o.q.d. as directed, Rx filled, Januvia 100mg p.o.q.d. as directed, Rx filled, Farxiga 10mg p.o.q.d. as directed, Rx filled, and Glipizide ER 10mg BID p.o.q.d. as directed, Rx filled; continue Lisinopril 10mg p.o.q.d. as directed, Rx filled; continue Simvastatin 40mg p.o.q.d. as directed, Rx filled - continue low carbohydrate/low sugar diet - check hemoglobin A1C and Urine Albumin/Creatinine Ratio\par obesity - continue low carbohydrate diet; previously recommended increasing CV exercise\par history of Vitamin D deficiency - check Vitamin D \par Gastroenterology\par Results of most recent screening colonoscopy to be requested from GI's office \par Podiatry\par Continue to follow up with podiatrist, Dr. Callahan annually for diabetic feet examination \par Immunization\par Shingrix - again discussed, patient to determine if vaccine is covered under medical benefits of current insurance plan and follow up for 1st dose if interested; otherwise, instructed to follow up at the pharmacy for vaccine administration\par \par check CBC, CMP, FLP, hemoglobin A1C, PSA, Vitamin D, and Urine Albumin/Creatinine Ratio

## 2022-12-27 NOTE — ASSESSMENT
[FreeTextEntry1] : Patient is a 62 year old male with a past medical history as above who presents for fasting blood work, Rx refill, and general follow-up.

## 2022-12-27 NOTE — PHYSICAL EXAM
[No Acute Distress] : no acute distress [Well Nourished] : well nourished [Well Developed] : well developed [Well-Appearing] : well-appearing [Normal Sclera/Conjunctiva] : normal sclera/conjunctiva [PERRL] : pupils equal round and reactive to light [EOMI] : extraocular movements intact [Normal Outer Ear/Nose] : the outer ears and nose were normal in appearance [Normal Oropharynx] : the oropharynx was normal [No JVD] : no jugular venous distention [No Lymphadenopathy] : no lymphadenopathy [Supple] : supple [Thyroid Normal, No Nodules] : the thyroid was normal and there were no nodules present [No Respiratory Distress] : no respiratory distress  [No Accessory Muscle Use] : no accessory muscle use [Clear to Auscultation] : lungs were clear to auscultation bilaterally [Normal Rate] : normal rate  [Regular Rhythm] : with a regular rhythm [Normal S1, S2] : normal S1 and S2 [No Murmur] : no murmur heard [No Carotid Bruits] : no carotid bruits [No Abdominal Bruit] : a ~M bruit was not heard ~T in the abdomen [No Varicosities] : no varicosities [Pedal Pulses Present] : the pedal pulses are present [No Edema] : there was no peripheral edema [No Palpable Aorta] : no palpable aorta [No Extremity Clubbing/Cyanosis] : no extremity clubbing/cyanosis [Soft] : abdomen soft [Non Tender] : non-tender [Non-distended] : non-distended [No Masses] : no abdominal mass palpated [No HSM] : no HSM [Normal Bowel Sounds] : normal bowel sounds [Normal Posterior Cervical Nodes] : no posterior cervical lymphadenopathy [Normal Anterior Cervical Nodes] : no anterior cervical lymphadenopathy [No CVA Tenderness] : no CVA  tenderness [No Spinal Tenderness] : no spinal tenderness [No Joint Swelling] : no joint swelling [Grossly Normal Strength/Tone] : grossly normal strength/tone [No Rash] : no rash [Coordination Grossly Intact] : coordination grossly intact [No Focal Deficits] : no focal deficits [Normal Gait] : normal gait [Deep Tendon Reflexes (DTR)] : deep tendon reflexes were 2+ and symmetric [Normal Affect] : the affect was normal [Normal Insight/Judgement] : insight and judgment were intact [Normal Voice/Communication] : normal voice/communication [Normal TMs] : both tympanic membranes were normal [Normal Nasal Mucosa] : the nasal mucosa was normal [Normal Supraclavicular Nodes] : no supraclavicular lymphadenopathy [Kyphosis] : no kyphosis [Scoliosis] : no scoliosis [Acne] : no acne [Speech Grossly Normal] : speech grossly normal [Memory Grossly Normal] : memory grossly normal [Alert and Oriented x3] : oriented to person, place, and time [Normal Mood] : the mood was normal [de-identified] : obese

## 2022-12-27 NOTE — ADDENDUM
[FreeTextEntry1] : I, Eric Pandya, acted solely as scribe for Dr. Aidan Julio, DO this date 12/27/2022 10:20AM .\par \par All medical record entries made by the Scribe were at my, Dr. Aidan Julio, DO direction and personally dictated by me on 12/27/2022 10:20AM. I have reviewed the chart and agree that the record accurately reflects my personal performance of the history, physical exam, assessment and plan. I have also personally directed, reviewed and agreed with the chart.

## 2022-12-30 ENCOUNTER — RX RENEWAL (OUTPATIENT)
Age: 62
End: 2022-12-30

## 2023-01-29 LAB
25(OH)D3 SERPL-MCNC: 72.3 NG/ML
ALBUMIN SERPL ELPH-MCNC: 4.7 G/DL
ALP BLD-CCNC: 72 U/L
ALT SERPL-CCNC: 41 U/L
ANION GAP SERPL CALC-SCNC: 12 MMOL/L
AST SERPL-CCNC: 24 U/L
BASOPHILS # BLD AUTO: 0.05 K/UL
BASOPHILS NFR BLD AUTO: 0.5 %
BILIRUB SERPL-MCNC: 0.3 MG/DL
BUN SERPL-MCNC: 17 MG/DL
CALCIUM SERPL-MCNC: 9.9 MG/DL
CHLORIDE SERPL-SCNC: 102 MMOL/L
CHOLEST SERPL-MCNC: 179 MG/DL
CO2 SERPL-SCNC: 28 MMOL/L
CREAT SERPL-MCNC: 0.9 MG/DL
CREAT SPEC-SCNC: 84 MG/DL
EGFR: 95 ML/MIN/1.73M2
EOSINOPHIL # BLD AUTO: 0.35 K/UL
EOSINOPHIL NFR BLD AUTO: 3.3 %
ESTIMATED AVERAGE GLUCOSE: 223 MG/DL
GLUCOSE SERPL-MCNC: 190 MG/DL
HBA1C MFR BLD HPLC: 9.4 %
HCT VFR BLD CALC: 50 %
HDLC SERPL-MCNC: 47 MG/DL
HGB BLD-MCNC: 15.5 G/DL
IMM GRANULOCYTES NFR BLD AUTO: 0.6 %
LDLC SERPL CALC-MCNC: 94 MG/DL
LYMPHOCYTES # BLD AUTO: 2.86 K/UL
LYMPHOCYTES NFR BLD AUTO: 26.8 %
MAN DIFF?: NORMAL
MCHC RBC-ENTMCNC: 30.7 PG
MCHC RBC-ENTMCNC: 31 GM/DL
MCV RBC AUTO: 99 FL
MICROALBUMIN 24H UR DL<=1MG/L-MCNC: <1.2 MG/DL
MICROALBUMIN/CREAT 24H UR-RTO: NORMAL MG/G
MONOCYTES # BLD AUTO: 0.78 K/UL
MONOCYTES NFR BLD AUTO: 7.3 %
NEUTROPHILS # BLD AUTO: 6.59 K/UL
NEUTROPHILS NFR BLD AUTO: 61.5 %
NONHDLC SERPL-MCNC: 132 MG/DL
PLATELET # BLD AUTO: 306 K/UL
POTASSIUM SERPL-SCNC: 5.5 MMOL/L
PROT SERPL-MCNC: 6.7 G/DL
PSA SERPL-MCNC: 0.54 NG/ML
RBC # BLD: 5.05 M/UL
RBC # FLD: 12.1 %
SODIUM SERPL-SCNC: 142 MMOL/L
TRIGL SERPL-MCNC: 187 MG/DL
WBC # FLD AUTO: 10.69 K/UL

## 2023-01-29 RX ORDER — SITAGLIPTIN 100 MG/1
100 TABLET, FILM COATED ORAL
Qty: 90 | Refills: 1 | Status: DISCONTINUED | COMMUNITY
Start: 2022-03-11 | End: 2023-01-29

## 2023-03-04 ENCOUNTER — NON-APPOINTMENT (OUTPATIENT)
Age: 63
End: 2023-03-04

## 2023-03-17 ENCOUNTER — APPOINTMENT (OUTPATIENT)
Dept: INTERNAL MEDICINE | Facility: CLINIC | Age: 63
End: 2023-03-17
Payer: COMMERCIAL

## 2023-03-17 VITALS
WEIGHT: 273 LBS | TEMPERATURE: 98.4 F | DIASTOLIC BLOOD PRESSURE: 68 MMHG | SYSTOLIC BLOOD PRESSURE: 128 MMHG | HEART RATE: 90 BPM | OXYGEN SATURATION: 96 % | RESPIRATION RATE: 16 BRPM | HEIGHT: 77 IN | BODY MASS INDEX: 32.23 KG/M2

## 2023-03-17 DIAGNOSIS — L03.116 CELLULITIS OF LEFT LOWER LIMB: ICD-10-CM

## 2023-03-17 PROCEDURE — 99214 OFFICE O/P EST MOD 30 MIN: CPT | Mod: 25

## 2023-03-18 PROBLEM — L03.116 CELLULITIS OF LEFT LOWER EXTREMITY: Status: ACTIVE | Noted: 2023-03-18

## 2023-03-18 NOTE — ASSESSMENT
[FreeTextEntry1] : Patient is a 62 year old male with a past medical history as above who presents for general follow up as well as follow up of left lower extremity cellulitis.

## 2023-03-18 NOTE — END OF VISIT
[FreeTextEntry3] : This note was written by Rossana Dugan on 03/17/2023, acting as a scribe for MD Dave.\par \par All medic record entries were at my, Dr.Meenu Bam MD, direction and personally dictated by me in 03/17/2023. I have personally reviewed the chart and agree that the record accurately reflects my personal performance of the history, physical exam, assessment, and plan.

## 2023-03-18 NOTE — HISTORY OF PRESENT ILLNESS
[FreeTextEntry1] : follow-up [de-identified] : Patient is a 62 year old male with a past medical history as below who presents for follow-up.\par \par Patient states he is taking all medications as prescribed and denies any adverse reactions or side effects. He denies lightheadedness or dizziness on Lisinopril. He denies diffuse arthralgias or myalgias on Simvastatin. He denies any gastrointestinal issues on Metformin. He denies any recent episodes of hypoglycemia.\par \par Patient is due to see ophthalmologist, Dr. Rodriguez for his annual diabetic eye exam. \par \par Patient has been trying to maintain a well-balanced diet.\par \par Patient received the flu vaccine this season. He received the Pneumovax 23 on 9/21/20. He received the Tdap (Adacel) Vaccine on 6/20/20. He has not received the Shingles (Shingrix) Vaccine. Patient states he has received the bivalent COVID-19 vaccine booster. He notes history of COVID-19 infection.\par \par Patient went to an urgent care for cellulitis on his left lower extremity and was sent  to [facility] ER. In the ED, a  left LE Doppler and lab work was performed. The doppler study was negative for acute DVT. Lab work results found elevated lactic acid (3.3), elevated glucose (155), stable WBC (8.72). The patient received IV antibiotics (Rocephin 1 gram x 1) and was subsequently  sent home  with oral antibiotics Cephalexin 500 MG 3x daily and Bactrim DS BID. He notes swelling and pain of left lower leg has improved with antibiotics.

## 2023-03-18 NOTE — PHYSICAL EXAM
[No Acute Distress] : no acute distress [Well Nourished] : well nourished [Well Developed] : well developed [Well-Appearing] : well-appearing [Normal Sclera/Conjunctiva] : normal sclera/conjunctiva [PERRL] : pupils equal round and reactive to light [EOMI] : extraocular movements intact [Normal Outer Ear/Nose] : the outer ears and nose were normal in appearance [Normal Oropharynx] : the oropharynx was normal [No JVD] : no jugular venous distention [No Lymphadenopathy] : no lymphadenopathy [Supple] : supple [Thyroid Normal, No Nodules] : the thyroid was normal and there were no nodules present [No Respiratory Distress] : no respiratory distress  [No Accessory Muscle Use] : no accessory muscle use [Clear to Auscultation] : lungs were clear to auscultation bilaterally [Normal Rate] : normal rate  [Regular Rhythm] : with a regular rhythm [Normal S1, S2] : normal S1 and S2 [No Murmur] : no murmur heard [No Carotid Bruits] : no carotid bruits [No Abdominal Bruit] : a ~M bruit was not heard ~T in the abdomen [Pedal Pulses Present] : the pedal pulses are present [No Edema] : there was no peripheral edema [No Palpable Aorta] : no palpable aorta [No Extremity Clubbing/Cyanosis] : no extremity clubbing/cyanosis [Soft] : abdomen soft [Non Tender] : non-tender [Non-distended] : non-distended [No Masses] : no abdominal mass palpated [No HSM] : no HSM [Normal Bowel Sounds] : normal bowel sounds [Normal Posterior Cervical Nodes] : no posterior cervical lymphadenopathy [Normal Anterior Cervical Nodes] : no anterior cervical lymphadenopathy [No CVA Tenderness] : no CVA  tenderness [No Spinal Tenderness] : no spinal tenderness [No Joint Swelling] : no joint swelling [Grossly Normal Strength/Tone] : grossly normal strength/tone [No Rash] : no rash [Coordination Grossly Intact] : coordination grossly intact [No Focal Deficits] : no focal deficits [Normal Gait] : normal gait [Deep Tendon Reflexes (DTR)] : deep tendon reflexes were 2+ and symmetric [Normal Affect] : the affect was normal [Normal Insight/Judgement] : insight and judgment were intact [Normal Voice/Communication] : normal voice/communication [Normal Nasal Mucosa] : the nasal mucosa was normal [Normal Supraclavicular Nodes] : no supraclavicular lymphadenopathy [Kyphosis] : no kyphosis [Scoliosis] : no scoliosis [Speech Grossly Normal] : speech grossly normal [Memory Grossly Normal] : memory grossly normal [Alert and Oriented x3] : oriented to person, place, and time [Normal Mood] : the mood was normal [de-identified] : obese [de-identified] : trace 1+ edema left LE; minimal erythema; no heat to the touch; varicose veins \par

## 2023-03-18 NOTE — PLAN
[FreeTextEntry1] : Ophthalmology\par Again advised to follow up with ophthalmologist, Dr. Rodriguez for annual diabetic eye exam; results to be requested\par Pulmonary\par Continue Breo Ellipta 100-25 MCG/ACT as directed; continue to follow up with pulmonologist, Dr. Nueñz\par Cardiovascular\par essential hypertension - continue Lisinopril 10mg p.o.q.d. as directed - continue low sodium diet and weight loss; will continue to monitor BP \par hyperlipidemia - continue Simvastatin 40mg p.o.q.d. as directed - continue low cholesterol/low fat diet \par hypertriglyceridemia - continue low cholesterol/low fat and low carbohydrate/low sugar diet  \par Continue Aspirin 81mg p.o.q.d. as directed\par Endocrinology\par diabetes - continue Metformin HCl 1000mg BID p.o.q.d. as directed, Rx filled, Januvia 100mg p.o.q.d. as directed, Farxiga 10mg p.o.q.d. as directed, and Glipizide ER 10mg BID p.o.q.d. as directed; continue Lisinopril 10mg p.o.q.d. as directed; continue Simvastatin 40mg p.o.q.d. as directed- continue low carbohydrate/low sugar diet - continue low carbohydrate diet; previously recommended increasing CV exercise\par Gastroenterology\par Results of most recent screening colonoscopy to be requested from GI's office \par Podiatry\par Continue to follow up with podiatrist, Dr. Callahan annually for diabetic feet examination\par Dermatology \par left lower extremity cellulitis- possibly secondary to/exacerbated by venous insufficiency; continue antibiotics Cephalexin 500 MG 3x daily and Bactrim DS; advised to elevated legs; advised low sodium diet-call for worsening erythema/warmth/fever/chills or any concerns that may develop\par Immunization\par Shingrix - again discussed, patient to determine if vaccine is covered under medical benefits of current insurance plan and follow up for 1st dose if interested; otherwise, instructed to follow up at the pharmacy for vaccine administration

## 2023-03-23 ENCOUNTER — RX RENEWAL (OUTPATIENT)
Age: 63
End: 2023-03-23

## 2023-04-11 ENCOUNTER — RX RENEWAL (OUTPATIENT)
Age: 63
End: 2023-04-11

## 2023-05-15 ENCOUNTER — RX RENEWAL (OUTPATIENT)
Age: 63
End: 2023-05-15

## 2023-06-26 ENCOUNTER — RX RENEWAL (OUTPATIENT)
Age: 63
End: 2023-06-26

## 2023-07-11 ENCOUNTER — RX RENEWAL (OUTPATIENT)
Age: 63
End: 2023-07-11

## 2023-08-10 ENCOUNTER — APPOINTMENT (OUTPATIENT)
Dept: INTERNAL MEDICINE | Facility: CLINIC | Age: 63
End: 2023-08-10
Payer: COMMERCIAL

## 2023-08-10 ENCOUNTER — RESULT CHARGE (OUTPATIENT)
Age: 63
End: 2023-08-10

## 2023-08-10 VITALS
DIASTOLIC BLOOD PRESSURE: 60 MMHG | HEART RATE: 64 BPM | WEIGHT: 268 LBS | HEIGHT: 77 IN | RESPIRATION RATE: 16 BRPM | BODY MASS INDEX: 31.64 KG/M2 | TEMPERATURE: 98.3 F | OXYGEN SATURATION: 95 % | SYSTOLIC BLOOD PRESSURE: 120 MMHG

## 2023-08-10 DIAGNOSIS — E78.5 HYPERLIPIDEMIA, UNSPECIFIED: ICD-10-CM

## 2023-08-10 DIAGNOSIS — E11.65 TYPE 2 DIABETES MELLITUS WITH HYPERGLYCEMIA: ICD-10-CM

## 2023-08-10 DIAGNOSIS — R10.814 LEFT LOWER QUADRANT ABDOMINAL TENDERNESS: ICD-10-CM

## 2023-08-10 DIAGNOSIS — Z12.11 ENCOUNTER FOR SCREENING FOR MALIGNANT NEOPLASM OF COLON: ICD-10-CM

## 2023-08-10 LAB
BILIRUB UR QL STRIP: NEGATIVE
CLARITY UR: CLEAR
COLLECTION METHOD: NORMAL
GLUCOSE UR-MCNC: ABNORMAL
HCG UR QL: 0.2 EU/DL
HGB UR QL STRIP.AUTO: NEGATIVE
KETONES UR-MCNC: NEGATIVE
LEUKOCYTE ESTERASE UR QL STRIP: NEGATIVE
NITRITE UR QL STRIP: NEGATIVE
PH UR STRIP: 5.5
PROT UR STRIP-MCNC: NEGATIVE
SP GR UR STRIP: 1.02

## 2023-08-10 PROCEDURE — 81003 URINALYSIS AUTO W/O SCOPE: CPT | Mod: QW

## 2023-08-10 PROCEDURE — 99214 OFFICE O/P EST MOD 30 MIN: CPT | Mod: 25

## 2023-08-10 PROCEDURE — 93000 ELECTROCARDIOGRAM COMPLETE: CPT

## 2023-08-10 NOTE — DATA REVIEWED
[FreeTextEntry1] : Reviewed medication list.  Reviewed phone conversation no Dr. Julio had with the patient.  Did discuss the note with the patient. Patient got upset and states that he will discuss his medication with Dr. Julio. Reviewed stress test from 2020.

## 2023-08-10 NOTE — PHYSICAL EXAM
[No Acute Distress] : no acute distress [Well Nourished] : well nourished [Well Developed] : well developed [Well-Appearing] : well-appearing [Normal Sclera/Conjunctiva] : normal sclera/conjunctiva [PERRL] : pupils equal round and reactive to light [EOMI] : extraocular movements intact [Normal Outer Ear/Nose] : the outer ears and nose were normal in appearance [Normal Oropharynx] : the oropharynx was normal [Normal TMs] : both tympanic membranes were normal [Normal Nasal Mucosa] : the nasal mucosa was normal [No JVD] : no jugular venous distention [Supple] : supple [No Lymphadenopathy] : no lymphadenopathy [Thyroid Normal, No Nodules] : the thyroid was normal and there were no nodules present [No Respiratory Distress] : no respiratory distress  [Clear to Auscultation] : lungs were clear to auscultation bilaterally [No Accessory Muscle Use] : no accessory muscle use [Normal Percussion] : the chest was normal to percussion [Normal Rate] : normal rate  [Regular Rhythm] : with a regular rhythm [Normal S1, S2] : normal S1 and S2 [No Carotid Bruits] : no carotid bruits [No Abdominal Bruit] : a ~M bruit was not heard ~T in the abdomen [Pedal Pulses Present] : the pedal pulses are present [No Edema] : there was no peripheral edema [No Extremity Clubbing/Cyanosis] : no extremity clubbing/cyanosis [No Palpable Aorta] : no palpable aorta [Normal Appearance] : normal in appearance [Soft] : abdomen soft [No Masses] : no abdominal mass palpated [No HSM] : no HSM [Normal Bowel Sounds] : normal bowel sounds [Obese] : obese [Normal Posterior Cervical Nodes] : no posterior cervical lymphadenopathy [Normal Anterior Cervical Nodes] : no anterior cervical lymphadenopathy [No CVA Tenderness] : no CVA  tenderness [No Spinal Tenderness] : no spinal tenderness [No Joint Swelling] : no joint swelling [Grossly Normal Strength/Tone] : grossly normal strength/tone [No Rash] : no rash [Male Pattern Baldness] : male pattern baldness [Normal Gait] : normal gait [Coordination Grossly Intact] : coordination grossly intact [No Focal Deficits] : no focal deficits [Deep Tendon Reflexes (DTR)] : deep tendon reflexes were 2+ and symmetric [Speech Grossly Normal] : speech grossly normal [Normal Affect] : the affect was normal [Normal Mood] : the mood was normal [Normal Insight/Judgement] : insight and judgment were intact [None] : no ulcers in either foot were found [] : left foot [___] : right foot points [unfilled] [Alert and Oriented x3] : oriented to person, place, and time [de-identified] : overweight  [de-identified] : with murmur,  [de-identified] : Large varicose veins [de-identified] : Left mid to lower quadrant abdominal Tenderness noted to palpitation no hernia appreciated [de-identified] : declined exam  [de-identified] :  Discoloration of the lower extremity,  purplish discoloration of bilateral toes. Had abrasion at the tip of the first toe with callus. Right foot callus and fissure proximal to the fourth and fifth digit [de-identified] : cool to touch with purplish discoloration  [de-identified] :   rubar discoloration of bilateral toes. Had abrasion at the tip of the first toe with callus. Right foot callus and fissure proximal to the fourth and fifth digit

## 2023-08-10 NOTE — HISTORY OF PRESENT ILLNESS
[FreeTextEntry1] : Fasting labs and a blood pressure check.  also requesting medication renewal  [de-identified] :  SHAWNA is a 62 year- male, with a past medical history as noted below, who present to the office today for Medication renewal and fasting blood work.  Patient states he is taking all his medication as directed.  After reviewing prior phone call note that the patient had with Dr. Grimm was advised to increase his glipizide to 20 mg and they discontinue Januvia.  Patient never increased glipizide to 20 mg still only taking 10.  Patient also states he has been having left-sided abdominal discomfort/pain that comes and goes.  Unaware of what makes it better currently resolves on its own.  Denies any change in bowel habits.  Denies any weight loss or blood in the stools.  Last colonoscopy can be was in 2017. Denies seeing endocrinology.  Denies following up with cardiology.

## 2023-08-10 NOTE — COUNSELING
[Encouraged to increase physical activity] : Encouraged to increase physical activity [Decrease Portions] : decrease portions [Good understanding] : Patient has a good understanding of disease, goals and obesity follow-up plan [FreeTextEntry2] : ADA diet

## 2023-08-10 NOTE — ASSESSMENT
[FreeTextEntry1] : A 62-year-old male, presents to the office today for fasting blood work, medication renewal and evaluation Left lower quadrant discomfort.

## 2023-08-10 NOTE — PLAN
[FreeTextEntry1] : Pulmonary Continue Breo Ellipta 100-25 MCG/ACT as directed; continue to follow up with pulmonologist, Dr. Nuñez Cardiovascular essential hypertension - continue Lisinopril 10mg daily as directed - continue low sodium diet and weight loss; will continue to monitor BP. Check CMP.Advised patient to follow back up with cardiology for reevaluation of coronary artery disease  hyperlipidemia - continue Simvastatin 40mg daily. as directed - continue low cholesterol/low fat diet. If LDLs greater than 70 would recommend discontinuing simvastatin and starting Crestor 20 for better LDL reduction. Check FLP today. hypertriglyceridemia - continue low cholesterol/low fat and low carbohydrate/low sugar diet.  Continue Aspirin 81mg p.o.q.d. as directed.  Endocrinology - Uncontrolled diabetes last hemoglobin A1c greater than 9 noncompliant medication regimen diabetes - continue Metformin HCl 1000mg BID p.o.q.d. as directed, Rx filled, Patient's Januvia was discontinued by Dr. Julio Secondary to the expense of the medication. Patient was advised to increase his glipizide to 20 mg and repeat hemoglobin A1c.  Patient never increased the glipizide as discussed and instructed by Dr. Julio. Did refill Glipizide ER 10mg BID p.o.q.d. today but only gave 30-day supply pending a1c. Continue Simvastatin 40mg p.o.q.d. as directed- continue low carbohydrate/low sugar diet - continue low carbohydrate diet; previously recommended increasing CV exercise. Check a1c. Advised patient he should be following up with endocrinology.  Patient is upset states he will talk to Dr. Petit for about his medication Ophthalmology Again, advised to follow up with ophthalmologist, Dr. Rodriguez for annual diabetic eye exam; results to be requested.  Gastroenterology Abdominal tenderness left lower quadrant-check CT scan of abdomen, CBC.  Advised patient follow-up with gastroenterology for repeat colonoscopy.  Podiatry Continue to follow up with podiatrist, Dr. Callahan annually for diabetic feet examination.  I spent 35  Minutes with the patient, half of which we discussed finding on physical exam  and coordinated care.  As well as reviewed my plans and follow ups. Dragon speech recognition software was used to create portions of this document.  An attempt at proofreading has been made to minimize errors please call if any questions arise.   Will send Dr. Julio a task today message and my concerns about this patient noncompliance as well as cardiovascular risk  PT should be seeing Q 3 months

## 2023-08-10 NOTE — REVIEW OF SYSTEMS
[Fever] : no fever [Chills] : no chills [Fatigue] : no fatigue [Discharge] : no discharge [Itching] : no itching [Earache] : no earache [Nosebleeds] : nosebleeds [Sore Throat] : no sore throat [Chest Pain] : no chest pain [Claudication] : no  leg claudication [Shortness Of Breath] : no shortness of breath [Wheezing] : no wheezing [Cough] : no cough [Abdominal Pain] : abdominal pain [Constipation] : no constipation [Vomiting] : no vomiting [Melena] : no melena [Joint Pain] : no joint pain [Joint Stiffness] : no joint stiffness [Back Pain] : no back pain [Joint Swelling] : no joint swelling [Itching] : no itching [Skin Rash] : no skin rash [Headache] : no headache [Dizziness] : no dizziness [Unsteady Walk] : no ataxia [Memory Loss] : no memory loss [Insomnia] : no insomnia [Anxiety] : no anxiety [Easy Bruising] : no easy bruising [Swollen Glands] : no swollen glands [Negative] : Heme/Lymph

## 2023-08-10 NOTE — HEALTH RISK ASSESSMENT
[Yes] : Yes [Monthly or less (1 pt)] : Monthly or less (1 point) [1 or 2 (0 pts)] : 1 or 2 (0 points) [Never (0 pts)] : Never (0 points) [No] : In the past 12 months have you used drugs other than those required for medical reasons? No [No falls in past year] : Patient reported no falls in the past year [0] : 2) Feeling down, depressed, or hopeless: Not at all (0) [PHQ-2 Negative - No further assessment needed] : PHQ-2 Negative - No further assessment needed [Audit-CScore] : 1 [[id number]] : 0 [Former] : Former

## 2023-08-11 LAB
ALBUMIN SERPL ELPH-MCNC: 4.8 G/DL
ALP BLD-CCNC: 77 U/L
ALT SERPL-CCNC: 21 U/L
ANION GAP SERPL CALC-SCNC: 13 MMOL/L
AST SERPL-CCNC: 17 U/L
BILIRUB SERPL-MCNC: 0.6 MG/DL
BUN SERPL-MCNC: 15 MG/DL
CALCIUM SERPL-MCNC: 9.8 MG/DL
CHLORIDE SERPL-SCNC: 100 MMOL/L
CHOLEST SERPL-MCNC: 145 MG/DL
CO2 SERPL-SCNC: 30 MMOL/L
CREAT SERPL-MCNC: 0.75 MG/DL
CREAT SPEC-SCNC: 101 MG/DL
EGFR: 101 ML/MIN/1.73M2
ESTIMATED AVERAGE GLUCOSE: 226 MG/DL
GLUCOSE SERPL-MCNC: 206 MG/DL
HBA1C MFR BLD HPLC: 9.5 %
HDLC SERPL-MCNC: 49 MG/DL
LDLC SERPL CALC-MCNC: 75 MG/DL
MICROALBUMIN 24H UR DL<=1MG/L-MCNC: <1.2 MG/DL
MICROALBUMIN/CREAT 24H UR-RTO: NORMAL MG/G
NONHDLC SERPL-MCNC: 96 MG/DL
POTASSIUM SERPL-SCNC: 4.9 MMOL/L
PROT SERPL-MCNC: 6.9 G/DL
SODIUM SERPL-SCNC: 143 MMOL/L
T4 FREE SERPL-MCNC: 1.2 NG/DL
TRIGL SERPL-MCNC: 118 MG/DL
TSH SERPL-ACNC: 2.43 UIU/ML

## 2023-08-14 ENCOUNTER — RESULT REVIEW (OUTPATIENT)
Age: 63
End: 2023-08-14

## 2023-08-19 ENCOUNTER — APPOINTMENT (OUTPATIENT)
Dept: CT IMAGING | Facility: CLINIC | Age: 63
End: 2023-08-19
Payer: COMMERCIAL

## 2023-08-19 ENCOUNTER — OUTPATIENT (OUTPATIENT)
Dept: OUTPATIENT SERVICES | Facility: HOSPITAL | Age: 63
LOS: 1 days | End: 2023-08-19
Payer: COMMERCIAL

## 2023-08-19 DIAGNOSIS — R10.814 LEFT LOWER QUADRANT ABDOMINAL TENDERNESS: ICD-10-CM

## 2023-08-19 PROCEDURE — 74176 CT ABD & PELVIS W/O CONTRAST: CPT

## 2023-08-19 PROCEDURE — 74176 CT ABD & PELVIS W/O CONTRAST: CPT | Mod: 26

## 2023-08-28 DIAGNOSIS — K40.20 BILATERAL INGUINAL HERNIA, W/OUT OBSTRUCTION OR GANGRENE, NOT SPECIFIED AS RECURRENT: ICD-10-CM

## 2023-08-28 DIAGNOSIS — N23 UNSPECIFIED RENAL COLIC: ICD-10-CM

## 2023-11-10 ENCOUNTER — APPOINTMENT (OUTPATIENT)
Dept: INTERNAL MEDICINE | Facility: CLINIC | Age: 63
End: 2023-11-10
Payer: COMMERCIAL

## 2023-11-10 VITALS
HEART RATE: 64 BPM | TEMPERATURE: 95.9 F | OXYGEN SATURATION: 95 % | SYSTOLIC BLOOD PRESSURE: 122 MMHG | HEIGHT: 77 IN | WEIGHT: 269.38 LBS | BODY MASS INDEX: 31.81 KG/M2 | RESPIRATION RATE: 16 BRPM | DIASTOLIC BLOOD PRESSURE: 78 MMHG

## 2023-11-10 DIAGNOSIS — Z12.5 ENCOUNTER FOR SCREENING FOR MALIGNANT NEOPLASM OF PROSTATE: ICD-10-CM

## 2023-11-10 PROCEDURE — 99215 OFFICE O/P EST HI 40 MIN: CPT | Mod: 25

## 2023-11-10 PROCEDURE — 36415 COLL VENOUS BLD VENIPUNCTURE: CPT

## 2023-11-10 RX ORDER — DAPAGLIFLOZIN 10 MG/1
10 TABLET, FILM COATED ORAL
Qty: 90 | Refills: 1 | Status: ACTIVE | COMMUNITY
Start: 2022-05-09 | End: 1900-01-01

## 2023-11-12 PROBLEM — Z12.5 SCREENING FOR PROSTATE CANCER: Status: ACTIVE | Noted: 2022-05-06

## 2023-11-13 LAB
25(OH)D3 SERPL-MCNC: 63.6 NG/ML
ALBUMIN SERPL ELPH-MCNC: 4.7 G/DL
ALP BLD-CCNC: 79 U/L
ALT SERPL-CCNC: 20 U/L
ANION GAP SERPL CALC-SCNC: 14 MMOL/L
AST SERPL-CCNC: 12 U/L
BASOPHILS # BLD AUTO: 0.05 K/UL
BASOPHILS NFR BLD AUTO: 0.5 %
BILIRUB SERPL-MCNC: 0.7 MG/DL
BUN SERPL-MCNC: 16 MG/DL
CALCIUM SERPL-MCNC: 9.9 MG/DL
CHLORIDE SERPL-SCNC: 98 MMOL/L
CHOLEST SERPL-MCNC: 155 MG/DL
CO2 SERPL-SCNC: 30 MMOL/L
CREAT SERPL-MCNC: 0.77 MG/DL
CREAT SPEC-SCNC: 82 MG/DL
EGFR: 100 ML/MIN/1.73M2
EOSINOPHIL # BLD AUTO: 0.34 K/UL
EOSINOPHIL NFR BLD AUTO: 3.4 %
ESTIMATED AVERAGE GLUCOSE: 220 MG/DL
GLUCOSE SERPL-MCNC: 128 MG/DL
HBA1C MFR BLD HPLC: 9.3 %
HCT VFR BLD CALC: 52.4 %
HDLC SERPL-MCNC: 47 MG/DL
HGB BLD-MCNC: 16.3 G/DL
IMM GRANULOCYTES NFR BLD AUTO: 0.3 %
LDLC SERPL CALC-MCNC: 85 MG/DL
LYMPHOCYTES # BLD AUTO: 3.12 K/UL
LYMPHOCYTES NFR BLD AUTO: 31 %
MAN DIFF?: NORMAL
MCHC RBC-ENTMCNC: 31.1 GM/DL
MCHC RBC-ENTMCNC: 31.2 PG
MCV RBC AUTO: 100.2 FL
MICROALBUMIN 24H UR DL<=1MG/L-MCNC: <1.2 MG/DL
MICROALBUMIN/CREAT 24H UR-RTO: NORMAL MG/G
MONOCYTES # BLD AUTO: 0.83 K/UL
MONOCYTES NFR BLD AUTO: 8.2 %
NEUTROPHILS # BLD AUTO: 5.7 K/UL
NEUTROPHILS NFR BLD AUTO: 56.6 %
NONHDLC SERPL-MCNC: 109 MG/DL
PLATELET # BLD AUTO: 250 K/UL
POTASSIUM SERPL-SCNC: 4.7 MMOL/L
PROT SERPL-MCNC: 7 G/DL
PSA SERPL-MCNC: 0.73 NG/ML
RBC # BLD: 5.23 M/UL
RBC # FLD: 12.1 %
SODIUM SERPL-SCNC: 142 MMOL/L
TRIGL SERPL-MCNC: 132 MG/DL
WBC # FLD AUTO: 10.07 K/UL

## 2023-11-15 ENCOUNTER — RX RENEWAL (OUTPATIENT)
Age: 63
End: 2023-11-15

## 2024-01-16 ENCOUNTER — RX RENEWAL (OUTPATIENT)
Age: 64
End: 2024-01-16

## 2024-03-22 ENCOUNTER — APPOINTMENT (OUTPATIENT)
Dept: INTERNAL MEDICINE | Facility: CLINIC | Age: 64
End: 2024-03-22
Payer: MEDICARE

## 2024-03-22 VITALS
HEART RATE: 56 BPM | WEIGHT: 276.13 LBS | BODY MASS INDEX: 32.6 KG/M2 | SYSTOLIC BLOOD PRESSURE: 120 MMHG | HEIGHT: 77 IN | OXYGEN SATURATION: 96 % | RESPIRATION RATE: 16 BRPM | TEMPERATURE: 98.4 F | DIASTOLIC BLOOD PRESSURE: 60 MMHG

## 2024-03-22 DIAGNOSIS — E55.9 VITAMIN D DEFICIENCY, UNSPECIFIED: ICD-10-CM

## 2024-03-22 DIAGNOSIS — R79.89 OTHER SPECIFIED ABNORMAL FINDINGS OF BLOOD CHEMISTRY: ICD-10-CM

## 2024-03-22 DIAGNOSIS — E11.8 TYPE 2 DIABETES MELLITUS WITH UNSPECIFIED COMPLICATIONS: ICD-10-CM

## 2024-03-22 DIAGNOSIS — E88.810 METABOLIC SYNDROME: ICD-10-CM

## 2024-03-22 DIAGNOSIS — I10 ESSENTIAL (PRIMARY) HYPERTENSION: ICD-10-CM

## 2024-03-22 DIAGNOSIS — E78.5 HYPERLIPIDEMIA, UNSPECIFIED: ICD-10-CM

## 2024-03-22 DIAGNOSIS — Z79.899 OTHER LONG TERM (CURRENT) DRUG THERAPY: ICD-10-CM

## 2024-03-22 PROCEDURE — 99214 OFFICE O/P EST MOD 30 MIN: CPT

## 2024-03-22 PROCEDURE — 36415 COLL VENOUS BLD VENIPUNCTURE: CPT

## 2024-03-22 PROCEDURE — G2211 COMPLEX E/M VISIT ADD ON: CPT

## 2024-03-22 RX ORDER — BLOOD SUGAR DIAGNOSTIC
STRIP MISCELLANEOUS
Qty: 150 | Refills: 3 | Status: ACTIVE | COMMUNITY
Start: 2021-04-05 | End: 1900-01-01

## 2024-03-24 PROBLEM — Z79.899 MEDICATION MANAGEMENT: Status: ACTIVE | Noted: 2021-11-12

## 2024-03-24 PROBLEM — E88.810 METABOLIC SYNDROME: Status: ACTIVE | Noted: 2019-04-08

## 2024-03-24 PROBLEM — E11.8 TYPE 2 DIABETES MELLITUS WITH COMPLICATION, WITHOUT LONG-TERM CURRENT USE OF INSULIN: Status: ACTIVE | Noted: 2019-04-08

## 2024-03-24 PROBLEM — E55.9 VITAMIN D DEFICIENCY: Status: ACTIVE | Noted: 2019-04-08

## 2024-03-24 PROBLEM — I10 ESSENTIAL (PRIMARY) HYPERTENSION: Status: ACTIVE | Noted: 2019-04-08

## 2024-03-24 PROBLEM — E78.5 DYSLIPIDEMIA: Status: ACTIVE | Noted: 2020-06-29

## 2024-03-24 PROBLEM — R79.89 ELEVATED LFTS: Status: ACTIVE | Noted: 2019-04-08

## 2024-03-24 LAB
25(OH)D3 SERPL-MCNC: 61.3 NG/ML
ALBUMIN SERPL ELPH-MCNC: 4.8 G/DL
ALP BLD-CCNC: 70 U/L
ALT SERPL-CCNC: 20 U/L
ANION GAP SERPL CALC-SCNC: 14 MMOL/L
AST SERPL-CCNC: 14 U/L
BASOPHILS # BLD AUTO: 0.03 K/UL
BASOPHILS NFR BLD AUTO: 0.3 %
BILIRUB SERPL-MCNC: 0.5 MG/DL
BUN SERPL-MCNC: 16 MG/DL
CALCIUM SERPL-MCNC: 9.9 MG/DL
CHLORIDE SERPL-SCNC: 99 MMOL/L
CHOLEST SERPL-MCNC: 142 MG/DL
CO2 SERPL-SCNC: 30 MMOL/L
CREAT SERPL-MCNC: 0.78 MG/DL
CREAT SPEC-SCNC: 113 MG/DL
EGFR: 99 ML/MIN/1.73M2
EOSINOPHIL # BLD AUTO: 0.42 K/UL
EOSINOPHIL NFR BLD AUTO: 4.3 %
ESTIMATED AVERAGE GLUCOSE: 180 MG/DL
GLUCOSE SERPL-MCNC: 170 MG/DL
HBA1C MFR BLD HPLC: 7.9 %
HCT VFR BLD CALC: 53.9 %
HDLC SERPL-MCNC: 48 MG/DL
HGB BLD-MCNC: 16.4 G/DL
IMM GRANULOCYTES NFR BLD AUTO: 0.3 %
LDLC SERPL CALC-MCNC: 75 MG/DL
LYMPHOCYTES # BLD AUTO: 2.78 K/UL
LYMPHOCYTES NFR BLD AUTO: 28.4 %
MAN DIFF?: NORMAL
MCHC RBC-ENTMCNC: 30.4 GM/DL
MCHC RBC-ENTMCNC: 31.1 PG
MCV RBC AUTO: 102.1 FL
MICROALBUMIN 24H UR DL<=1MG/L-MCNC: 1.3 MG/DL
MICROALBUMIN/CREAT 24H UR-RTO: 11 MG/G
MONOCYTES # BLD AUTO: 0.79 K/UL
MONOCYTES NFR BLD AUTO: 8.1 %
NEUTROPHILS # BLD AUTO: 5.74 K/UL
NEUTROPHILS NFR BLD AUTO: 58.6 %
NONHDLC SERPL-MCNC: 95 MG/DL
PLATELET # BLD AUTO: 265 K/UL
POTASSIUM SERPL-SCNC: 5 MMOL/L
PROT SERPL-MCNC: 6.9 G/DL
RBC # BLD: 5.28 M/UL
RBC # FLD: 12.7 %
SODIUM SERPL-SCNC: 142 MMOL/L
TRIGL SERPL-MCNC: 110 MG/DL
WBC # FLD AUTO: 9.79 K/UL

## 2024-03-24 NOTE — PHYSICAL EXAM
[No Acute Distress] : no acute distress [Well Nourished] : well nourished [Well-Appearing] : well-appearing [Well Developed] : well developed [Normal Sclera/Conjunctiva] : normal sclera/conjunctiva [PERRL] : pupils equal round and reactive to light [Normal Outer Ear/Nose] : the outer ears and nose were normal in appearance [EOMI] : extraocular movements intact [Normal Oropharynx] : the oropharynx was normal [No JVD] : no jugular venous distention [No Lymphadenopathy] : no lymphadenopathy [Thyroid Normal, No Nodules] : the thyroid was normal and there were no nodules present [Supple] : supple [No Accessory Muscle Use] : no accessory muscle use [No Respiratory Distress] : no respiratory distress  [Clear to Auscultation] : lungs were clear to auscultation bilaterally [Normal Rate] : normal rate  [Normal S1, S2] : normal S1 and S2 [Regular Rhythm] : with a regular rhythm [No Murmur] : no murmur heard [No Abdominal Bruit] : a ~M bruit was not heard ~T in the abdomen [No Carotid Bruits] : no carotid bruits [No Varicosities] : no varicosities [No Edema] : there was no peripheral edema [Pedal Pulses Present] : the pedal pulses are present [No Palpable Aorta] : no palpable aorta [No Extremity Clubbing/Cyanosis] : no extremity clubbing/cyanosis [Soft] : abdomen soft [Non-distended] : non-distended [Non Tender] : non-tender [No Masses] : no abdominal mass palpated [No HSM] : no HSM [Normal Bowel Sounds] : normal bowel sounds [Normal Posterior Cervical Nodes] : no posterior cervical lymphadenopathy [Normal Anterior Cervical Nodes] : no anterior cervical lymphadenopathy [No CVA Tenderness] : no CVA  tenderness [No Spinal Tenderness] : no spinal tenderness [No Joint Swelling] : no joint swelling [Grossly Normal Strength/Tone] : grossly normal strength/tone [No Rash] : no rash [Coordination Grossly Intact] : coordination grossly intact [No Focal Deficits] : no focal deficits [Normal Gait] : normal gait [Deep Tendon Reflexes (DTR)] : deep tendon reflexes were 2+ and symmetric [Normal Affect] : the affect was normal [Normal Insight/Judgement] : insight and judgment were intact [Normal] : affect was normal and insight and judgment were intact [Normal TMs] : both tympanic membranes were normal [Normal Voice/Communication] : normal voice/communication [Normal Nasal Mucosa] : the nasal mucosa was normal [Normal Supraclavicular Nodes] : no supraclavicular lymphadenopathy [Scoliosis] : no scoliosis [Kyphosis] : no kyphosis [Acne] : no acne [Speech Grossly Normal] : speech grossly normal [Memory Grossly Normal] : memory grossly normal [Alert and Oriented x3] : oriented to person, place, and time [Normal Mood] : the mood was normal [de-identified] : overweight

## 2024-03-24 NOTE — HISTORY OF PRESENT ILLNESS
[FreeTextEntry1] : fasting blood work and general follow-up  [de-identified] : Patient is a 63-year-old male with a past medical history as below who presents for fasting blood work and general follow-up. Patient states he is taking all medications as prescribed and denies any adverse reactions or side effects. He denies lightheadedness or dizziness on Lisinopril. He denies diffuse arthralgias or myalgias on Simvastatin. He denies any gastrointestinal issues on Metformin. He denies any recent episodes of hypoglycemia. Patient has been maintaining a balanced diet and regular exercise (paddle ball).   Last labs in November of 2023 demonstrated elevated glucose levels at 128 mg/dL and elevated hemoglobin A1C level at 9.3%.  Patient notes some episodes of orthostatic dizziness. He states that getting up from a seated position quickly results in him becoming dizzy and lightheaded.    Patient requests a Rx refill for OneTouch Ultra Blue Strips at this time.

## 2024-03-24 NOTE — ASSESSMENT
[FreeTextEntry1] : Patient is a 63-year-old male with a past medical history as above who presents for fasting blood work and general follow-up.

## 2024-03-24 NOTE — PLAN
[FreeTextEntry1] : Pulmonary Continue Breo Ellipta 100-25 MCG/ACT as directed; continue to follow up with pulmonologist, Dr. Nuñez Cardiovascular essential hypertension - continue Lisinopril 10mg p.o.q.d. as directed - continue low sodium diet and weight loss; will continue to monitor BP hyperlipidemia - continue Simvastatin 40mg p.o.q.d. as directed - continue low cholesterol/low fat diet - check FLP/LFTs hypertriglyceridemia - continue low cholesterol/low fat and low carbohydrate/low sugar diet Continue Aspirin 81mg p.o.q.d. as directed Endocrinology diabetes - continue Metformin HCl 1000mg BID p.o.q.d. as directed; continue Farxiga 10mg p.o.q.d. as directed; continue Glipizide ER 10mg BID p.o.q.d. as directed; continue Lisinopril 10mg p.o.q.d. as directed; continue Simvastatin 40mg p.o.q.d. as directed; continue OneTouch Ultra Blue Strip p.o. BID as directed, Rx filled - continue low carbohydrate/low sugar diet - previously recommended increasing CV exercise - check hemoglobin A1C, urine for albumin to creatinine ratio  check hemoglobin A1C, Albumin/Creatinine Ratio, CBC, CMP, FLP, and Vitamin D

## 2024-03-24 NOTE — ADDENDUM
[FreeTextEntry1] : I, Carissacarlie Brownashly, acted solely as scribe for Dr. Aidan Julio DO on this date 3/22/2024.  All medical record entries made by the Scribe were at my, Dr. Aidan Julio DO direction and personally dictated by me on 3/22/2024. I have reviewed the chart and agree that the record accurately reflects my personal performance of the history, physical exam, assessment and plan. I have also personally directed, reviewed and agreed with the chart.

## 2024-04-03 ENCOUNTER — RX RENEWAL (OUTPATIENT)
Age: 64
End: 2024-04-03

## 2024-04-03 RX ORDER — DAPAGLIFLOZIN 10 MG/1
10 TABLET, FILM COATED ORAL
Qty: 90 | Refills: 1 | Status: ACTIVE | COMMUNITY
Start: 2024-04-03 | End: 1900-01-01

## 2024-05-09 ENCOUNTER — RX RENEWAL (OUTPATIENT)
Age: 64
End: 2024-05-09

## 2024-05-09 RX ORDER — GLIPIZIDE 10 MG/1
10 TABLET, FILM COATED, EXTENDED RELEASE ORAL DAILY
Qty: 180 | Refills: 0 | Status: ACTIVE | COMMUNITY
Start: 2022-04-12 | End: 1900-01-01

## 2024-05-09 RX ORDER — METFORMIN HYDROCHLORIDE 1000 MG/1
1000 TABLET, COATED ORAL
Qty: 180 | Refills: 1 | Status: ACTIVE | COMMUNITY
Start: 2022-03-11 | End: 1900-01-01

## 2024-05-09 RX ORDER — LISINOPRIL 10 MG/1
10 TABLET ORAL
Qty: 90 | Refills: 1 | Status: ACTIVE | COMMUNITY
Start: 2019-04-19 | End: 1900-01-01

## 2024-05-09 RX ORDER — SIMVASTATIN 40 MG/1
40 TABLET, FILM COATED ORAL
Qty: 90 | Refills: 1 | Status: ACTIVE | COMMUNITY
Start: 2019-02-26 | End: 1900-01-01

## 2024-06-09 ENCOUNTER — RX RENEWAL (OUTPATIENT)
Age: 64
End: 2024-06-09

## 2024-06-09 RX ORDER — PIOGLITAZONE HYDROCHLORIDE 15 MG/1
15 TABLET ORAL DAILY
Qty: 90 | Refills: 0 | Status: ACTIVE | COMMUNITY
Start: 2023-12-14 | End: 1900-01-01

## 2024-08-05 ENCOUNTER — RX RENEWAL (OUTPATIENT)
Age: 64
End: 2024-08-05

## 2024-08-12 ENCOUNTER — RX RENEWAL (OUTPATIENT)
Age: 64
End: 2024-08-12

## 2024-09-13 ENCOUNTER — RX RENEWAL (OUTPATIENT)
Age: 64
End: 2024-09-13

## 2024-10-01 ENCOUNTER — APPOINTMENT (OUTPATIENT)
Dept: INTERNAL MEDICINE | Facility: CLINIC | Age: 64
End: 2024-10-01
Payer: MEDICARE

## 2024-10-01 VITALS
SYSTOLIC BLOOD PRESSURE: 114 MMHG | RESPIRATION RATE: 16 BRPM | OXYGEN SATURATION: 93 % | HEART RATE: 56 BPM | WEIGHT: 274.44 LBS | TEMPERATURE: 98.2 F | BODY MASS INDEX: 32.4 KG/M2 | HEIGHT: 77 IN | DIASTOLIC BLOOD PRESSURE: 60 MMHG

## 2024-10-01 DIAGNOSIS — E55.9 VITAMIN D DEFICIENCY, UNSPECIFIED: ICD-10-CM

## 2024-10-01 DIAGNOSIS — I10 ESSENTIAL (PRIMARY) HYPERTENSION: ICD-10-CM

## 2024-10-01 DIAGNOSIS — R79.89 OTHER SPECIFIED ABNORMAL FINDINGS OF BLOOD CHEMISTRY: ICD-10-CM

## 2024-10-01 DIAGNOSIS — Z79.899 OTHER LONG TERM (CURRENT) DRUG THERAPY: ICD-10-CM

## 2024-10-01 DIAGNOSIS — E11.8 TYPE 2 DIABETES MELLITUS WITH UNSPECIFIED COMPLICATIONS: ICD-10-CM

## 2024-10-01 DIAGNOSIS — E78.5 HYPERLIPIDEMIA, UNSPECIFIED: ICD-10-CM

## 2024-10-01 DIAGNOSIS — E88.810 METABOLIC SYNDROME: ICD-10-CM

## 2024-10-01 PROCEDURE — 36415 COLL VENOUS BLD VENIPUNCTURE: CPT

## 2024-10-01 PROCEDURE — G2211 COMPLEX E/M VISIT ADD ON: CPT

## 2024-10-01 PROCEDURE — 99214 OFFICE O/P EST MOD 30 MIN: CPT

## 2024-10-01 NOTE — HISTORY OF PRESENT ILLNESS
[FreeTextEntry1] : fasting blood work and general follow-up  [de-identified] : Patient is a 63-year-old male with a past medical history as below who presents for fasting blood work and general follow-up. Patient states he is taking all medications as prescribed and denies any adverse reactions or side effects. He denies lightheadedness or dizziness on Lisinopril. He denies diffuse arthralgias or myalgias on Simvastatin. He denies any gastrointestinal issues on Metformin. He denies any recent episodes of hypoglycemia. Patient has been maintaining a balanced diet and regular exercise (paddle ball). Since retiring, he is more relaxed and has been more able to dedicate time and effort to low carbohydrate diet and being active. He has lost 2 pounds since his last visit.

## 2024-10-01 NOTE — PHYSICAL EXAM
[No Acute Distress] : no acute distress [Well Nourished] : well nourished [Well Developed] : well developed [Well-Appearing] : well-appearing [Normal Voice/Communication] : normal voice/communication [Normal Sclera/Conjunctiva] : normal sclera/conjunctiva [PERRL] : pupils equal round and reactive to light [EOMI] : extraocular movements intact [Normal Outer Ear/Nose] : the outer ears and nose were normal in appearance [Normal Oropharynx] : the oropharynx was normal [Normal TMs] : both tympanic membranes were normal [Normal Nasal Mucosa] : the nasal mucosa was normal [No JVD] : no jugular venous distention [No Lymphadenopathy] : no lymphadenopathy [Supple] : supple [Thyroid Normal, No Nodules] : the thyroid was normal and there were no nodules present [No Respiratory Distress] : no respiratory distress  [No Accessory Muscle Use] : no accessory muscle use [Clear to Auscultation] : lungs were clear to auscultation bilaterally [Normal Rate] : normal rate  [Regular Rhythm] : with a regular rhythm [Normal S1, S2] : normal S1 and S2 [No Murmur] : no murmur heard [No Carotid Bruits] : no carotid bruits [No Abdominal Bruit] : a ~M bruit was not heard ~T in the abdomen [No Varicosities] : no varicosities [Pedal Pulses Present] : the pedal pulses are present [No Edema] : there was no peripheral edema [No Palpable Aorta] : no palpable aorta [Soft] : abdomen soft [Non Tender] : non-tender [Non-distended] : non-distended [No Masses] : no abdominal mass palpated [No HSM] : no HSM [Normal Bowel Sounds] : normal bowel sounds [Normal Supraclavicular Nodes] : no supraclavicular lymphadenopathy [Normal Posterior Cervical Nodes] : no posterior cervical lymphadenopathy [Normal Anterior Cervical Nodes] : no anterior cervical lymphadenopathy [No CVA Tenderness] : no CVA  tenderness [No Spinal Tenderness] : no spinal tenderness [Kyphosis] : no kyphosis [Scoliosis] : no scoliosis [No Joint Swelling] : no joint swelling [Grossly Normal Strength/Tone] : grossly normal strength/tone [No Rash] : no rash [Acne] : no acne [Coordination Grossly Intact] : coordination grossly intact [No Focal Deficits] : no focal deficits [Normal Gait] : normal gait [Deep Tendon Reflexes (DTR)] : deep tendon reflexes were 2+ and symmetric [Speech Grossly Normal] : speech grossly normal [Memory Grossly Normal] : memory grossly normal [Normal Affect] : the affect was normal [Alert and Oriented x3] : oriented to person, place, and time [Normal Mood] : the mood was normal [Normal Insight/Judgement] : insight and judgment were intact [Normal] : affect was normal and insight and judgment were intact [de-identified] : overweight

## 2024-10-01 NOTE — PLAN
[FreeTextEntry1] : Pulmonary Continue Breo Ellipta 100-25 MCG/ACT as directed; continue to follow up with pulmonologist, Dr. Nuñez Cardiovascular essential hypertension - continue Lisinopril 10mg p.o.q.d. as directed - continue low sodium diet and weight loss; will continue to monitor BP hyperlipidemia - continue Simvastatin 40mg p.o.q.d. as directed - continue low cholesterol/low fat diet - check FLP/LFTs hypertriglyceridemia - continue low cholesterol/low fat and low carbohydrate/low sugar diet Continue Aspirin 81mg p.o.q.d. as directed Endocrinology diabetes - continue Metformin HCl 1000mg BID p.o.q.d. as directed; continue Farxiga 10mg p.o.q.d. as directed; continue Glipizide ER 10mg BID p.o.q.d. as directed; continue Lisinopril 10mg p.o.q.d. as directed; continue Simvastatin 40mg p.o.q.d. as directed; continue OneTouch Ultra Blue Strip p.o. BID as directed - continue low carbohydrate/low sugar diet - previously recommended increasing CV exercise - check hemoglobin A1C, urine for albumin to creatinine ratio  check hemoglobin A1C, Albumin/Creatinine Ratio, CBC, CMP, FLP, and Vitamin D

## 2024-10-02 ENCOUNTER — RX RENEWAL (OUTPATIENT)
Age: 64
End: 2024-10-02

## 2024-10-02 LAB
25(OH)D3 SERPL-MCNC: 65.2 NG/ML
ALBUMIN SERPL ELPH-MCNC: 4.7 G/DL
ALP BLD-CCNC: 66 U/L
ALT SERPL-CCNC: 19 U/L
ANION GAP SERPL CALC-SCNC: 16 MMOL/L
AST SERPL-CCNC: 14 U/L
BASOPHILS # BLD AUTO: 0.04 K/UL
BASOPHILS NFR BLD AUTO: 0.4 %
BILIRUB SERPL-MCNC: 0.5 MG/DL
BUN SERPL-MCNC: 15 MG/DL
CALCIUM SERPL-MCNC: 10 MG/DL
CHLORIDE SERPL-SCNC: 97 MMOL/L
CHOLEST SERPL-MCNC: 144 MG/DL
CO2 SERPL-SCNC: 30 MMOL/L
CREAT SERPL-MCNC: 0.83 MG/DL
CREAT SPEC-SCNC: 101 MG/DL
EGFR: 97 ML/MIN/1.73M2
EOSINOPHIL # BLD AUTO: 0.4 K/UL
EOSINOPHIL NFR BLD AUTO: 4.5 %
ESTIMATED AVERAGE GLUCOSE: 169 MG/DL
GLUCOSE SERPL-MCNC: 141 MG/DL
HBA1C MFR BLD HPLC: 7.5 %
HCT VFR BLD CALC: 51.9 %
HDLC SERPL-MCNC: 45 MG/DL
HGB BLD-MCNC: 16.1 G/DL
IMM GRANULOCYTES NFR BLD AUTO: 0.3 %
LDLC SERPL CALC-MCNC: 74 MG/DL
LYMPHOCYTES # BLD AUTO: 2.62 K/UL
LYMPHOCYTES NFR BLD AUTO: 29.2 %
MAN DIFF?: NORMAL
MCHC RBC-ENTMCNC: 31 GM/DL
MCHC RBC-ENTMCNC: 31.1 PG
MCV RBC AUTO: 100.2 FL
MICROALBUMIN 24H UR DL<=1MG/L-MCNC: <1.2 MG/DL
MICROALBUMIN/CREAT 24H UR-RTO: NORMAL MG/G
MONOCYTES # BLD AUTO: 0.72 K/UL
MONOCYTES NFR BLD AUTO: 8 %
NEUTROPHILS # BLD AUTO: 5.16 K/UL
NEUTROPHILS NFR BLD AUTO: 57.6 %
NONHDLC SERPL-MCNC: 98 MG/DL
PLATELET # BLD AUTO: 269 K/UL
POTASSIUM SERPL-SCNC: 5 MMOL/L
PROT SERPL-MCNC: 6.7 G/DL
PSA SERPL-MCNC: 0.58 NG/ML
RBC # BLD: 5.18 M/UL
RBC # FLD: 12.8 %
SODIUM SERPL-SCNC: 142 MMOL/L
TRIGL SERPL-MCNC: 139 MG/DL
WBC # FLD AUTO: 8.97 K/UL

## 2024-10-08 RX ORDER — EMPAGLIFLOZIN 10 MG/1
10 TABLET, FILM COATED ORAL
Qty: 90 | Refills: 1 | Status: ACTIVE | COMMUNITY
Start: 2024-10-08 | End: 1900-01-01

## 2024-11-06 ENCOUNTER — RX RENEWAL (OUTPATIENT)
Age: 64
End: 2024-11-06

## 2025-06-06 ENCOUNTER — RX RENEWAL (OUTPATIENT)
Age: 65
End: 2025-06-06

## 2025-06-12 ENCOUNTER — OUTPATIENT (OUTPATIENT)
Dept: OUTPATIENT SERVICES | Facility: HOSPITAL | Age: 65
LOS: 1 days | End: 2025-06-12
Payer: MEDICARE

## 2025-06-12 ENCOUNTER — APPOINTMENT (OUTPATIENT)
Dept: WOUND CARE | Facility: HOSPITAL | Age: 65
End: 2025-06-12
Payer: MEDICARE

## 2025-06-12 VITALS
TEMPERATURE: 98 F | DIASTOLIC BLOOD PRESSURE: 73 MMHG | HEIGHT: 77 IN | BODY MASS INDEX: 32.35 KG/M2 | SYSTOLIC BLOOD PRESSURE: 119 MMHG | WEIGHT: 274 LBS | OXYGEN SATURATION: 90 % | HEART RATE: 62 BPM | RESPIRATION RATE: 16 BRPM

## 2025-06-12 VITALS — SYSTOLIC BLOOD PRESSURE: 120 MMHG | DIASTOLIC BLOOD PRESSURE: 68 MMHG

## 2025-06-12 DIAGNOSIS — L97.501 NON-PRESSURE CHRONIC ULCER OF OTHER PART OF UNSPECIFIED FOOT LIMITED TO BREAKDOWN OF SKIN: ICD-10-CM

## 2025-06-12 LAB
ALBUMIN SERPL ELPH-MCNC: 4 G/DL — SIGNIFICANT CHANGE UP (ref 3.3–5)
ALP SERPL-CCNC: 84 U/L — SIGNIFICANT CHANGE UP (ref 40–120)
ALT FLD-CCNC: 33 U/L — SIGNIFICANT CHANGE UP (ref 12–78)
ANION GAP SERPL CALC-SCNC: 7 MMOL/L — SIGNIFICANT CHANGE UP (ref 5–17)
AST SERPL-CCNC: 11 U/L — LOW (ref 15–37)
BASOPHILS # BLD AUTO: 0.05 K/UL — SIGNIFICANT CHANGE UP (ref 0–0.2)
BASOPHILS NFR BLD AUTO: 0.5 % — SIGNIFICANT CHANGE UP (ref 0–2)
BILIRUB SERPL-MCNC: 0.4 MG/DL — SIGNIFICANT CHANGE UP (ref 0.2–1.2)
BUN SERPL-MCNC: 21 MG/DL — SIGNIFICANT CHANGE UP (ref 7–23)
CALCIUM SERPL-MCNC: 10 MG/DL — SIGNIFICANT CHANGE UP (ref 8.5–10.1)
CHLORIDE SERPL-SCNC: 103 MMOL/L — SIGNIFICANT CHANGE UP (ref 96–108)
CO2 SERPL-SCNC: 26 MMOL/L — SIGNIFICANT CHANGE UP (ref 22–31)
CREAT SERPL-MCNC: 0.85 MG/DL — SIGNIFICANT CHANGE UP (ref 0.5–1.3)
EGFR: 96 ML/MIN/1.73M2 — SIGNIFICANT CHANGE UP
EGFR: 96 ML/MIN/1.73M2 — SIGNIFICANT CHANGE UP
EOSINOPHIL # BLD AUTO: 0.79 K/UL — HIGH (ref 0–0.5)
EOSINOPHIL NFR BLD AUTO: 7.8 % — HIGH (ref 0–6)
ERYTHROCYTE [SEDIMENTATION RATE] IN BLOOD: 2 MM/HR — SIGNIFICANT CHANGE UP (ref 0–15)
GLUCOSE SERPL-MCNC: 103 MG/DL — HIGH (ref 70–99)
HCT VFR BLD CALC: 50.6 % — HIGH (ref 39–50)
HGB BLD-MCNC: 16.2 G/DL — SIGNIFICANT CHANGE UP (ref 13–17)
IMM GRANULOCYTES NFR BLD AUTO: 0.6 % — SIGNIFICANT CHANGE UP (ref 0–0.9)
INR BLD: 0.92 RATIO — SIGNIFICANT CHANGE UP (ref 0.85–1.16)
LYMPHOCYTES # BLD AUTO: 3.1 K/UL — SIGNIFICANT CHANGE UP (ref 1–3.3)
LYMPHOCYTES # BLD AUTO: 30.7 % — SIGNIFICANT CHANGE UP (ref 13–44)
MCHC RBC-ENTMCNC: 30.5 PG — SIGNIFICANT CHANGE UP (ref 27–34)
MCHC RBC-ENTMCNC: 32 G/DL — SIGNIFICANT CHANGE UP (ref 32–36)
MCV RBC AUTO: 95.3 FL — SIGNIFICANT CHANGE UP (ref 80–100)
MONOCYTES # BLD AUTO: 0.97 K/UL — HIGH (ref 0–0.9)
MONOCYTES NFR BLD AUTO: 9.6 % — SIGNIFICANT CHANGE UP (ref 2–14)
NEUTROPHILS # BLD AUTO: 5.14 K/UL — SIGNIFICANT CHANGE UP (ref 1.8–7.4)
NEUTROPHILS NFR BLD AUTO: 50.8 % — SIGNIFICANT CHANGE UP (ref 43–77)
NRBC BLD AUTO-RTO: 0 /100 WBCS — SIGNIFICANT CHANGE UP (ref 0–0)
PLATELET # BLD AUTO: 254 K/UL — SIGNIFICANT CHANGE UP (ref 150–400)
POTASSIUM SERPL-MCNC: 4.2 MMOL/L — SIGNIFICANT CHANGE UP (ref 3.5–5.3)
POTASSIUM SERPL-SCNC: 4.2 MMOL/L — SIGNIFICANT CHANGE UP (ref 3.5–5.3)
PROT SERPL-MCNC: 7.6 G/DL — SIGNIFICANT CHANGE UP (ref 6–8.3)
PROTHROM AB SERPL-ACNC: 10.8 SEC — SIGNIFICANT CHANGE UP (ref 9.9–13.4)
RBC # BLD: 5.31 M/UL — SIGNIFICANT CHANGE UP (ref 4.2–5.8)
RBC # FLD: 12.5 % — SIGNIFICANT CHANGE UP (ref 10.3–14.5)
SODIUM SERPL-SCNC: 136 MMOL/L — SIGNIFICANT CHANGE UP (ref 135–145)
WBC # BLD: 10.11 K/UL — SIGNIFICANT CHANGE UP (ref 3.8–10.5)
WBC # FLD AUTO: 10.11 K/UL — SIGNIFICANT CHANGE UP (ref 3.8–10.5)

## 2025-06-12 PROCEDURE — 36415 COLL VENOUS BLD VENIPUNCTURE: CPT

## 2025-06-12 PROCEDURE — 85610 PROTHROMBIN TIME: CPT

## 2025-06-12 PROCEDURE — 80053 COMPREHEN METABOLIC PANEL: CPT

## 2025-06-12 PROCEDURE — 73630 X-RAY EXAM OF FOOT: CPT

## 2025-06-12 PROCEDURE — G0463: CPT

## 2025-06-12 PROCEDURE — 99203 OFFICE O/P NEW LOW 30 MIN: CPT

## 2025-06-12 PROCEDURE — 85025 COMPLETE CBC W/AUTO DIFF WBC: CPT

## 2025-06-12 PROCEDURE — 85652 RBC SED RATE AUTOMATED: CPT

## 2025-06-12 PROCEDURE — 73630 X-RAY EXAM OF FOOT: CPT | Mod: 26,RT

## 2025-06-12 PROCEDURE — 86140 C-REACTIVE PROTEIN: CPT

## 2025-06-12 PROCEDURE — 84134 ASSAY OF PREALBUMIN: CPT

## 2025-06-12 RX ORDER — CIPROFLOXACIN HYDROCHLORIDE 500 MG/1
500 TABLET, FILM COATED ORAL
Qty: 14 | Refills: 0 | Status: COMPLETED | COMMUNITY
Start: 2025-06-12 | End: 2025-06-19

## 2025-06-12 RX ORDER — CETIRIZINE HYDROCHLORIDE 10 MG/1
TABLET, FILM COATED ORAL
Refills: 0 | Status: ACTIVE | COMMUNITY

## 2025-06-13 LAB
CRP SERPL-MCNC: <3 MG/L — SIGNIFICANT CHANGE UP
PREALB SERPL-MCNC: 30 MG/DL — SIGNIFICANT CHANGE UP (ref 20–40)

## 2025-06-15 PROBLEM — Z86.79 H/O ORTHOSTATIC HYPOTENSION: Status: ACTIVE | Noted: 2025-06-12

## 2025-06-15 PROBLEM — Z86.39 HISTORY OF TYPE 2 DIABETES MELLITUS: Status: RESOLVED | Noted: 2025-06-12 | Resolved: 2025-06-15

## 2025-06-16 DIAGNOSIS — I10 ESSENTIAL (PRIMARY) HYPERTENSION: ICD-10-CM

## 2025-06-16 DIAGNOSIS — Z87.891 PERSONAL HISTORY OF NICOTINE DEPENDENCE: ICD-10-CM

## 2025-06-16 DIAGNOSIS — Z80.8 FAMILY HISTORY OF MALIGNANT NEOPLASM OF OTHER ORGANS OR SYSTEMS: ICD-10-CM

## 2025-06-16 DIAGNOSIS — Z83.438 FAMILY HISTORY OF OTHER DISORDER OF LIPOPROTEIN METABOLISM AND OTHER LIPIDEMIA: ICD-10-CM

## 2025-06-16 DIAGNOSIS — L97.512 NON-PRESSURE CHRONIC ULCER OF OTHER PART OF RIGHT FOOT WITH FAT LAYER EXPOSED: ICD-10-CM

## 2025-06-16 DIAGNOSIS — E11.621 TYPE 2 DIABETES MELLITUS WITH FOOT ULCER: ICD-10-CM

## 2025-06-16 DIAGNOSIS — Z79.82 LONG TERM (CURRENT) USE OF ASPIRIN: ICD-10-CM

## 2025-06-16 DIAGNOSIS — Z83.3 FAMILY HISTORY OF DIABETES MELLITUS: ICD-10-CM

## 2025-06-16 DIAGNOSIS — E78.5 HYPERLIPIDEMIA, UNSPECIFIED: ICD-10-CM

## 2025-06-16 DIAGNOSIS — Z79.84 LONG TERM (CURRENT) USE OF ORAL HYPOGLYCEMIC DRUGS: ICD-10-CM

## 2025-06-20 ENCOUNTER — OUTPATIENT (OUTPATIENT)
Dept: OUTPATIENT SERVICES | Facility: HOSPITAL | Age: 65
LOS: 1 days | End: 2025-06-20
Payer: MEDICARE

## 2025-06-20 ENCOUNTER — APPOINTMENT (OUTPATIENT)
Dept: WOUND CARE | Facility: HOSPITAL | Age: 65
End: 2025-06-20
Payer: MEDICARE

## 2025-06-20 VITALS
DIASTOLIC BLOOD PRESSURE: 70 MMHG | TEMPERATURE: 98 F | HEART RATE: 60 BPM | RESPIRATION RATE: 15 BRPM | OXYGEN SATURATION: 91 % | HEIGHT: 77 IN | SYSTOLIC BLOOD PRESSURE: 113 MMHG | WEIGHT: 274 LBS | BODY MASS INDEX: 32.35 KG/M2

## 2025-06-20 DIAGNOSIS — L97.501 NON-PRESSURE CHRONIC ULCER OF OTHER PART OF UNSPECIFIED FOOT LIMITED TO BREAKDOWN OF SKIN: ICD-10-CM

## 2025-06-20 DIAGNOSIS — E11.621 TYPE 2 DIABETES MELLITUS WITH FOOT ULCER: ICD-10-CM

## 2025-06-20 PROCEDURE — 93923 UPR/LXTR ART STDY 3+ LVLS: CPT

## 2025-06-20 PROCEDURE — 73718 MRI LOWER EXTREMITY W/O DYE: CPT | Mod: 26,RT

## 2025-06-20 PROCEDURE — 73718 MRI LOWER EXTREMITY W/O DYE: CPT

## 2025-06-20 PROCEDURE — 99215 OFFICE O/P EST HI 40 MIN: CPT

## 2025-06-20 PROCEDURE — 93923 UPR/LXTR ART STDY 3+ LVLS: CPT | Mod: 26

## 2025-06-20 PROCEDURE — G0463: CPT

## 2025-06-20 RX ORDER — CIPROFLOXACIN HYDROCHLORIDE 500 MG/1
500 TABLET, FILM COATED ORAL
Qty: 14 | Refills: 0 | Status: ACTIVE | COMMUNITY
Start: 2025-06-20 | End: 1900-01-01

## 2025-06-23 DIAGNOSIS — I10 ESSENTIAL (PRIMARY) HYPERTENSION: ICD-10-CM

## 2025-06-23 DIAGNOSIS — E11.69 TYPE 2 DIABETES MELLITUS WITH OTHER SPECIFIED COMPLICATION: ICD-10-CM

## 2025-06-23 DIAGNOSIS — Z84.1 FAMILY HISTORY OF DISORDERS OF KIDNEY AND URETER: ICD-10-CM

## 2025-06-23 DIAGNOSIS — Z87.891 PERSONAL HISTORY OF NICOTINE DEPENDENCE: ICD-10-CM

## 2025-06-23 DIAGNOSIS — M86.9 OSTEOMYELITIS, UNSPECIFIED: ICD-10-CM

## 2025-06-23 DIAGNOSIS — Z80.8 FAMILY HISTORY OF MALIGNANT NEOPLASM OF OTHER ORGANS OR SYSTEMS: ICD-10-CM

## 2025-06-23 DIAGNOSIS — Z79.82 LONG TERM (CURRENT) USE OF ASPIRIN: ICD-10-CM

## 2025-06-23 DIAGNOSIS — Z79.84 LONG TERM (CURRENT) USE OF ORAL HYPOGLYCEMIC DRUGS: ICD-10-CM

## 2025-06-23 DIAGNOSIS — E78.5 HYPERLIPIDEMIA, UNSPECIFIED: ICD-10-CM

## 2025-06-23 DIAGNOSIS — L97.514 NON-PRESSURE CHRONIC ULCER OF OTHER PART OF RIGHT FOOT WITH NECROSIS OF BONE: ICD-10-CM

## 2025-06-23 DIAGNOSIS — Z83.3 FAMILY HISTORY OF DIABETES MELLITUS: ICD-10-CM

## 2025-06-23 DIAGNOSIS — Z83.438 FAMILY HISTORY OF OTHER DISORDER OF LIPOPROTEIN METABOLISM AND OTHER LIPIDEMIA: ICD-10-CM

## 2025-06-25 ENCOUNTER — APPOINTMENT (OUTPATIENT)
Dept: WOUND CARE | Facility: HOSPITAL | Age: 65
End: 2025-06-25
Payer: MEDICARE

## 2025-06-25 ENCOUNTER — OUTPATIENT (OUTPATIENT)
Dept: OUTPATIENT SERVICES | Facility: HOSPITAL | Age: 65
LOS: 1 days | End: 2025-06-25
Payer: MEDICARE

## 2025-06-25 VITALS
DIASTOLIC BLOOD PRESSURE: 73 MMHG | HEIGHT: 77 IN | HEART RATE: 72 BPM | TEMPERATURE: 98.3 F | WEIGHT: 274 LBS | RESPIRATION RATE: 16 BRPM | BODY MASS INDEX: 32.35 KG/M2 | OXYGEN SATURATION: 91 % | SYSTOLIC BLOOD PRESSURE: 116 MMHG

## 2025-06-25 DIAGNOSIS — L97.501 NON-PRESSURE CHRONIC ULCER OF OTHER PART OF UNSPECIFIED FOOT LIMITED TO BREAKDOWN OF SKIN: ICD-10-CM

## 2025-06-25 PROCEDURE — 99213 OFFICE O/P EST LOW 20 MIN: CPT

## 2025-06-25 PROCEDURE — G0463: CPT

## 2025-06-25 RX ORDER — CIPROFLOXACIN HYDROCHLORIDE 500 MG/1
500 TABLET, FILM COATED ORAL
Qty: 14 | Refills: 0 | Status: COMPLETED | COMMUNITY
Start: 2025-06-25 | End: 2025-07-02

## 2025-06-26 DIAGNOSIS — Z83.3 FAMILY HISTORY OF DIABETES MELLITUS: ICD-10-CM

## 2025-06-26 DIAGNOSIS — Z80.8 FAMILY HISTORY OF MALIGNANT NEOPLASM OF OTHER ORGANS OR SYSTEMS: ICD-10-CM

## 2025-06-26 DIAGNOSIS — Z79.82 LONG TERM (CURRENT) USE OF ASPIRIN: ICD-10-CM

## 2025-06-26 DIAGNOSIS — Z79.84 LONG TERM (CURRENT) USE OF ORAL HYPOGLYCEMIC DRUGS: ICD-10-CM

## 2025-06-26 DIAGNOSIS — Z83.438 FAMILY HISTORY OF OTHER DISORDER OF LIPOPROTEIN METABOLISM AND OTHER LIPIDEMIA: ICD-10-CM

## 2025-06-26 DIAGNOSIS — E11.69 TYPE 2 DIABETES MELLITUS WITH OTHER SPECIFIED COMPLICATION: ICD-10-CM

## 2025-06-26 DIAGNOSIS — Z87.891 PERSONAL HISTORY OF NICOTINE DEPENDENCE: ICD-10-CM

## 2025-06-26 DIAGNOSIS — I10 ESSENTIAL (PRIMARY) HYPERTENSION: ICD-10-CM

## 2025-06-26 DIAGNOSIS — L97.514 NON-PRESSURE CHRONIC ULCER OF OTHER PART OF RIGHT FOOT WITH NECROSIS OF BONE: ICD-10-CM

## 2025-06-26 DIAGNOSIS — E78.5 HYPERLIPIDEMIA, UNSPECIFIED: ICD-10-CM

## 2025-06-30 ENCOUNTER — APPOINTMENT (OUTPATIENT)
Dept: PODIATRY | Facility: CLINIC | Age: 65
End: 2025-06-30

## 2025-07-01 ENCOUNTER — OUTPATIENT (OUTPATIENT)
Dept: OUTPATIENT SERVICES | Facility: HOSPITAL | Age: 65
LOS: 1 days | End: 2025-07-01
Payer: MEDICARE

## 2025-07-01 ENCOUNTER — APPOINTMENT (OUTPATIENT)
Dept: WOUND CARE | Facility: HOSPITAL | Age: 65
End: 2025-07-01
Payer: MEDICARE

## 2025-07-01 VITALS
OXYGEN SATURATION: 93 % | HEIGHT: 77 IN | SYSTOLIC BLOOD PRESSURE: 129 MMHG | DIASTOLIC BLOOD PRESSURE: 67 MMHG | HEART RATE: 58 BPM | RESPIRATION RATE: 16 BRPM | WEIGHT: 274 LBS | TEMPERATURE: 98.5 F | BODY MASS INDEX: 32.35 KG/M2

## 2025-07-01 DIAGNOSIS — E11.69 TYPE 2 DIABETES MELLITUS WITH OTHER SPECIFIED COMPLICATION: ICD-10-CM

## 2025-07-01 PROCEDURE — G0463: CPT

## 2025-07-01 PROCEDURE — 99213 OFFICE O/P EST LOW 20 MIN: CPT

## 2025-07-01 RX ORDER — CIPROFLOXACIN HYDROCHLORIDE 500 MG/1
500 TABLET, FILM COATED ORAL
Qty: 30 | Refills: 2 | Status: ACTIVE | COMMUNITY
Start: 2025-07-01 | End: 1900-01-01

## 2025-07-02 ENCOUNTER — OUTPATIENT (OUTPATIENT)
Dept: OUTPATIENT SERVICES | Facility: HOSPITAL | Age: 65
LOS: 1 days | End: 2025-07-02
Payer: MEDICARE

## 2025-07-02 ENCOUNTER — APPOINTMENT (OUTPATIENT)
Dept: VASCULAR SURGERY | Facility: HOSPITAL | Age: 65
End: 2025-07-02
Payer: MEDICARE

## 2025-07-02 VITALS
TEMPERATURE: 98.4 F | OXYGEN SATURATION: 92 % | RESPIRATION RATE: 17 BRPM | HEIGHT: 77 IN | BODY MASS INDEX: 32.35 KG/M2 | SYSTOLIC BLOOD PRESSURE: 144 MMHG | HEART RATE: 79 BPM | WEIGHT: 274 LBS | DIASTOLIC BLOOD PRESSURE: 76 MMHG

## 2025-07-02 DIAGNOSIS — Z84.1 FAMILY HISTORY OF DISORDERS OF KIDNEY AND URETER: ICD-10-CM

## 2025-07-02 DIAGNOSIS — Z79.82 LONG TERM (CURRENT) USE OF ASPIRIN: ICD-10-CM

## 2025-07-02 DIAGNOSIS — Z80.8 FAMILY HISTORY OF MALIGNANT NEOPLASM OF OTHER ORGANS OR SYSTEMS: ICD-10-CM

## 2025-07-02 DIAGNOSIS — E78.5 HYPERLIPIDEMIA, UNSPECIFIED: ICD-10-CM

## 2025-07-02 DIAGNOSIS — I10 ESSENTIAL (PRIMARY) HYPERTENSION: ICD-10-CM

## 2025-07-02 DIAGNOSIS — E11.69 TYPE 2 DIABETES MELLITUS WITH OTHER SPECIFIED COMPLICATION: ICD-10-CM

## 2025-07-02 DIAGNOSIS — Z79.84 LONG TERM (CURRENT) USE OF ORAL HYPOGLYCEMIC DRUGS: ICD-10-CM

## 2025-07-02 DIAGNOSIS — Z87.891 PERSONAL HISTORY OF NICOTINE DEPENDENCE: ICD-10-CM

## 2025-07-02 DIAGNOSIS — Z83.438 FAMILY HISTORY OF OTHER DISORDER OF LIPOPROTEIN METABOLISM AND OTHER LIPIDEMIA: ICD-10-CM

## 2025-07-02 DIAGNOSIS — Z83.3 FAMILY HISTORY OF DIABETES MELLITUS: ICD-10-CM

## 2025-07-02 DIAGNOSIS — E11.621 TYPE 2 DIABETES MELLITUS WITH FOOT ULCER: ICD-10-CM

## 2025-07-02 DIAGNOSIS — Z79.899 OTHER LONG TERM (CURRENT) DRUG THERAPY: ICD-10-CM

## 2025-07-02 PROCEDURE — G0463: CPT

## 2025-07-02 PROCEDURE — 99203 OFFICE O/P NEW LOW 30 MIN: CPT

## 2025-07-07 ENCOUNTER — RX RENEWAL (OUTPATIENT)
Age: 65
End: 2025-07-07

## 2025-07-09 ENCOUNTER — APPOINTMENT (OUTPATIENT)
Dept: WOUND CARE | Facility: HOSPITAL | Age: 65
End: 2025-07-09
Payer: MEDICARE

## 2025-07-09 ENCOUNTER — OUTPATIENT (OUTPATIENT)
Dept: OUTPATIENT SERVICES | Facility: HOSPITAL | Age: 65
LOS: 1 days | End: 2025-07-09
Payer: MEDICARE

## 2025-07-09 ENCOUNTER — NON-APPOINTMENT (OUTPATIENT)
Age: 65
End: 2025-07-09

## 2025-07-09 VITALS
TEMPERATURE: 99 F | BODY MASS INDEX: 32.35 KG/M2 | OXYGEN SATURATION: 93 % | HEART RATE: 66 BPM | RESPIRATION RATE: 16 BRPM | WEIGHT: 274 LBS | SYSTOLIC BLOOD PRESSURE: 101 MMHG | HEIGHT: 77 IN | DIASTOLIC BLOOD PRESSURE: 62 MMHG

## 2025-07-09 DIAGNOSIS — E11.621 TYPE 2 DIABETES MELLITUS WITH FOOT ULCER: ICD-10-CM

## 2025-07-09 PROBLEM — L97.512 CHRONIC FOOT ULCER, RIGHT, WITH FAT LAYER EXPOSED: Status: ACTIVE | Noted: 2025-06-15

## 2025-07-09 PROBLEM — L97.514 CHRONIC FOOT ULCER, RIGHT, WITH NECROSIS OF BONE: Status: ACTIVE | Noted: 2025-06-20

## 2025-07-09 PROCEDURE — 88311 DECALCIFY TISSUE: CPT | Mod: 26

## 2025-07-09 PROCEDURE — 88304 TISSUE EXAM BY PATHOLOGIST: CPT | Mod: 26

## 2025-07-09 PROCEDURE — 28825 PARTIAL AMPUTATION OF TOE: CPT | Mod: T5

## 2025-07-10 ENCOUNTER — APPOINTMENT (OUTPATIENT)
Dept: WOUND CARE | Facility: HOSPITAL | Age: 65
End: 2025-07-10
Payer: MEDICARE

## 2025-07-10 ENCOUNTER — OUTPATIENT (OUTPATIENT)
Dept: OUTPATIENT SERVICES | Facility: HOSPITAL | Age: 65
LOS: 1 days | End: 2025-07-10
Payer: MEDICARE

## 2025-07-10 VITALS
HEIGHT: 77 IN | TEMPERATURE: 99.1 F | SYSTOLIC BLOOD PRESSURE: 113 MMHG | DIASTOLIC BLOOD PRESSURE: 74 MMHG | BODY MASS INDEX: 32.35 KG/M2 | OXYGEN SATURATION: 93 % | RESPIRATION RATE: 16 BRPM | WEIGHT: 274 LBS | HEART RATE: 61 BPM

## 2025-07-10 DIAGNOSIS — E11.69 TYPE 2 DIABETES MELLITUS WITH OTHER SPECIFIED COMPLICATION: ICD-10-CM

## 2025-07-10 DIAGNOSIS — L97.514 NON-PRESSURE CHRONIC ULCER OF OTHER PART OF RIGHT FOOT WITH NECROSIS OF BONE: ICD-10-CM

## 2025-07-10 DIAGNOSIS — E78.5 HYPERLIPIDEMIA, UNSPECIFIED: ICD-10-CM

## 2025-07-10 DIAGNOSIS — E11.621 TYPE 2 DIABETES MELLITUS WITH FOOT ULCER: ICD-10-CM

## 2025-07-10 DIAGNOSIS — Z87.891 PERSONAL HISTORY OF NICOTINE DEPENDENCE: ICD-10-CM

## 2025-07-10 DIAGNOSIS — Z79.82 LONG TERM (CURRENT) USE OF ASPIRIN: ICD-10-CM

## 2025-07-10 DIAGNOSIS — Z79.84 LONG TERM (CURRENT) USE OF ORAL HYPOGLYCEMIC DRUGS: ICD-10-CM

## 2025-07-10 DIAGNOSIS — Z79.899 OTHER LONG TERM (CURRENT) DRUG THERAPY: ICD-10-CM

## 2025-07-10 DIAGNOSIS — Z83.438 FAMILY HISTORY OF OTHER DISORDER OF LIPOPROTEIN METABOLISM AND OTHER LIPIDEMIA: ICD-10-CM

## 2025-07-10 DIAGNOSIS — I10 ESSENTIAL (PRIMARY) HYPERTENSION: ICD-10-CM

## 2025-07-10 PROCEDURE — 87077 CULTURE AEROBIC IDENTIFY: CPT

## 2025-07-10 PROCEDURE — 28825 PARTIAL AMPUTATION OF TOE: CPT | Mod: T5

## 2025-07-10 PROCEDURE — 88311 DECALCIFY TISSUE: CPT

## 2025-07-10 PROCEDURE — 87075 CULTR BACTERIA EXCEPT BLOOD: CPT

## 2025-07-10 PROCEDURE — 99214 OFFICE O/P EST MOD 30 MIN: CPT

## 2025-07-10 PROCEDURE — G0463: CPT

## 2025-07-10 PROCEDURE — 87070 CULTURE OTHR SPECIMN AEROBIC: CPT

## 2025-07-10 PROCEDURE — 87186 SC STD MICRODIL/AGAR DIL: CPT

## 2025-07-10 PROCEDURE — 88304 TISSUE EXAM BY PATHOLOGIST: CPT

## 2025-07-11 ENCOUNTER — APPOINTMENT (OUTPATIENT)
Dept: WOUND CARE | Facility: HOSPITAL | Age: 65
End: 2025-07-11
Payer: MEDICARE

## 2025-07-11 ENCOUNTER — OUTPATIENT (OUTPATIENT)
Dept: OUTPATIENT SERVICES | Facility: HOSPITAL | Age: 65
LOS: 1 days | End: 2025-07-11
Payer: MEDICARE

## 2025-07-11 VITALS
HEIGHT: 77 IN | BODY MASS INDEX: 32.35 KG/M2 | OXYGEN SATURATION: 91 % | DIASTOLIC BLOOD PRESSURE: 67 MMHG | TEMPERATURE: 98.5 F | WEIGHT: 274 LBS | RESPIRATION RATE: 18 BRPM | HEART RATE: 56 BPM | SYSTOLIC BLOOD PRESSURE: 113 MMHG
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
## 2025-07-11 DIAGNOSIS — Z87.891 PERSONAL HISTORY OF NICOTINE DEPENDENCE: ICD-10-CM

## 2025-07-11 DIAGNOSIS — Z89.411 ACQUIRED ABSENCE OF RIGHT GREAT TOE: ICD-10-CM

## 2025-07-11 DIAGNOSIS — E11.69 TYPE 2 DIABETES MELLITUS WITH OTHER SPECIFIED COMPLICATION: ICD-10-CM

## 2025-07-11 DIAGNOSIS — Z79.84 LONG TERM (CURRENT) USE OF ORAL HYPOGLYCEMIC DRUGS: ICD-10-CM

## 2025-07-11 DIAGNOSIS — Z79.899 OTHER LONG TERM (CURRENT) DRUG THERAPY: ICD-10-CM

## 2025-07-11 DIAGNOSIS — M86.9 OSTEOMYELITIS, UNSPECIFIED: ICD-10-CM

## 2025-07-11 DIAGNOSIS — T87.89 OTHER COMPLICATIONS OF AMPUTATION STUMP: ICD-10-CM

## 2025-07-11 DIAGNOSIS — Z83.438 FAMILY HISTORY OF OTHER DISORDER OF LIPOPROTEIN METABOLISM AND OTHER LIPIDEMIA: ICD-10-CM

## 2025-07-11 DIAGNOSIS — Z83.3 FAMILY HISTORY OF DIABETES MELLITUS: ICD-10-CM

## 2025-07-11 DIAGNOSIS — Z79.82 LONG TERM (CURRENT) USE OF ASPIRIN: ICD-10-CM

## 2025-07-11 DIAGNOSIS — I10 ESSENTIAL (PRIMARY) HYPERTENSION: ICD-10-CM

## 2025-07-11 DIAGNOSIS — Z80.8 FAMILY HISTORY OF MALIGNANT NEOPLASM OF OTHER ORGANS OR SYSTEMS: ICD-10-CM

## 2025-07-11 DIAGNOSIS — E11.621 TYPE 2 DIABETES MELLITUS WITH FOOT ULCER: ICD-10-CM

## 2025-07-11 DIAGNOSIS — E78.5 HYPERLIPIDEMIA, UNSPECIFIED: ICD-10-CM

## 2025-07-11 LAB
GRAM STN FLD: SIGNIFICANT CHANGE UP
SPECIMEN SOURCE: SIGNIFICANT CHANGE UP

## 2025-07-11 PROCEDURE — G0463: CPT

## 2025-07-11 PROCEDURE — ZZZZZ: CPT

## 2025-07-12 ENCOUNTER — OUTPATIENT (OUTPATIENT)
Dept: OUTPATIENT SERVICES | Facility: HOSPITAL | Age: 65
LOS: 1 days | End: 2025-07-12
Payer: MEDICARE

## 2025-07-12 ENCOUNTER — APPOINTMENT (OUTPATIENT)
Dept: WOUND CARE | Facility: HOSPITAL | Age: 65
End: 2025-07-12
Payer: MEDICARE

## 2025-07-12 VITALS
HEIGHT: 77 IN | WEIGHT: 274 LBS | TEMPERATURE: 98.2 F | RESPIRATION RATE: 18 BRPM | HEART RATE: 61 BPM | DIASTOLIC BLOOD PRESSURE: 70 MMHG | SYSTOLIC BLOOD PRESSURE: 115 MMHG | OXYGEN SATURATION: 94 % | BODY MASS INDEX: 32.35 KG/M2

## 2025-07-12 DIAGNOSIS — M86.9 OSTEOMYELITIS, UNSPECIFIED: ICD-10-CM

## 2025-07-12 DIAGNOSIS — Z89.411 ACQUIRED ABSENCE OF RIGHT GREAT TOE: ICD-10-CM

## 2025-07-12 DIAGNOSIS — E11.69 TYPE 2 DIABETES MELLITUS WITH OTHER SPECIFIED COMPLICATION: ICD-10-CM

## 2025-07-12 DIAGNOSIS — T87.89 OTHER COMPLICATIONS OF AMPUTATION STUMP: ICD-10-CM

## 2025-07-12 DIAGNOSIS — E11.621 TYPE 2 DIABETES MELLITUS WITH FOOT ULCER: ICD-10-CM
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
## 2025-07-12 PROCEDURE — G0463: CPT

## 2025-07-12 PROCEDURE — ZZZZZ: CPT

## 2025-07-14 ENCOUNTER — APPOINTMENT (OUTPATIENT)
Dept: WOUND CARE | Facility: HOSPITAL | Age: 65
End: 2025-07-14
Payer: MEDICARE

## 2025-07-14 ENCOUNTER — OUTPATIENT (OUTPATIENT)
Dept: OUTPATIENT SERVICES | Facility: HOSPITAL | Age: 65
LOS: 1 days | End: 2025-07-14
Payer: MEDICARE

## 2025-07-14 VITALS
OXYGEN SATURATION: 94 % | BODY MASS INDEX: 32.35 KG/M2 | HEIGHT: 77 IN | DIASTOLIC BLOOD PRESSURE: 67 MMHG | SYSTOLIC BLOOD PRESSURE: 119 MMHG | TEMPERATURE: 98.4 F | WEIGHT: 274 LBS | RESPIRATION RATE: 18 BRPM | HEART RATE: 58 BPM

## 2025-07-14 DIAGNOSIS — E11.621 TYPE 2 DIABETES MELLITUS WITH FOOT ULCER: ICD-10-CM

## 2025-07-14 PROCEDURE — G0463: CPT

## 2025-07-14 PROCEDURE — ZZZZZ: CPT

## 2025-07-15 DIAGNOSIS — T87.89 OTHER COMPLICATIONS OF AMPUTATION STUMP: ICD-10-CM

## 2025-07-15 DIAGNOSIS — E11.69 TYPE 2 DIABETES MELLITUS WITH OTHER SPECIFIED COMPLICATION: ICD-10-CM

## 2025-07-15 DIAGNOSIS — Z89.411 ACQUIRED ABSENCE OF RIGHT GREAT TOE: ICD-10-CM
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
## 2025-07-15 DIAGNOSIS — M86.9 OSTEOMYELITIS, UNSPECIFIED: ICD-10-CM
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
## 2025-07-16 LAB — GRAM STN FLD: ABNORMAL

## 2025-07-17 ENCOUNTER — OUTPATIENT (OUTPATIENT)
Dept: OUTPATIENT SERVICES | Facility: HOSPITAL | Age: 65
LOS: 1 days | End: 2025-07-17
Payer: MEDICARE

## 2025-07-17 ENCOUNTER — NON-APPOINTMENT (OUTPATIENT)
Age: 65
End: 2025-07-17

## 2025-07-17 ENCOUNTER — APPOINTMENT (OUTPATIENT)
Dept: WOUND CARE | Facility: HOSPITAL | Age: 65
End: 2025-07-17
Payer: MEDICARE

## 2025-07-17 DIAGNOSIS — Z83.3 FAMILY HISTORY OF DIABETES MELLITUS: ICD-10-CM

## 2025-07-17 DIAGNOSIS — T87.89 OTHER COMPLICATIONS OF AMPUTATION STUMP: ICD-10-CM

## 2025-07-17 DIAGNOSIS — Z83.438 FAMILY HISTORY OF OTHER DISORDER OF LIPOPROTEIN METABOLISM AND OTHER LIPIDEMIA: ICD-10-CM

## 2025-07-17 DIAGNOSIS — Z89.411 ACQUIRED ABSENCE OF RIGHT GREAT TOE: ICD-10-CM

## 2025-07-17 DIAGNOSIS — I10 ESSENTIAL (PRIMARY) HYPERTENSION: ICD-10-CM
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
## 2025-07-17 DIAGNOSIS — E11.621 TYPE 2 DIABETES MELLITUS WITH FOOT ULCER: ICD-10-CM

## 2025-07-17 DIAGNOSIS — E11.69 TYPE 2 DIABETES MELLITUS WITH OTHER SPECIFIED COMPLICATION: ICD-10-CM
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
## 2025-07-17 DIAGNOSIS — Z79.899 OTHER LONG TERM (CURRENT) DRUG THERAPY: ICD-10-CM

## 2025-07-17 DIAGNOSIS — Z87.891 PERSONAL HISTORY OF NICOTINE DEPENDENCE: ICD-10-CM

## 2025-07-17 DIAGNOSIS — E78.5 HYPERLIPIDEMIA, UNSPECIFIED: ICD-10-CM

## 2025-07-17 DIAGNOSIS — Z79.82 LONG TERM (CURRENT) USE OF ASPIRIN: ICD-10-CM

## 2025-07-17 DIAGNOSIS — Z79.84 LONG TERM (CURRENT) USE OF ORAL HYPOGLYCEMIC DRUGS: ICD-10-CM

## 2025-07-17 DIAGNOSIS — Z80.8 FAMILY HISTORY OF MALIGNANT NEOPLASM OF OTHER ORGANS OR SYSTEMS: ICD-10-CM

## 2025-07-17 LAB
-  AMIKACIN: SIGNIFICANT CHANGE UP
-  AZTREONAM: SIGNIFICANT CHANGE UP
-  CEFEPIME: SIGNIFICANT CHANGE UP
-  CEFTRIAXONE: SIGNIFICANT CHANGE UP
-  CIPROFLOXACIN: SIGNIFICANT CHANGE UP
-  GENTAMICIN: SIGNIFICANT CHANGE UP
-  LEVOFLOXACIN: SIGNIFICANT CHANGE UP
-  MEROPENEM: SIGNIFICANT CHANGE UP
-  PIPERACILLIN/TAZOBACTAM: SIGNIFICANT CHANGE UP
-  TOBRAMYCIN: SIGNIFICANT CHANGE UP
-  TRIMETHOPRIM/SULFAMETHOXAZOLE: SIGNIFICANT CHANGE UP
CULTURE RESULTS: ABNORMAL
METHOD TYPE: SIGNIFICANT CHANGE UP
ORGANISM # SPEC MICROSCOPIC CNT: ABNORMAL
ORGANISM # SPEC MICROSCOPIC CNT: SIGNIFICANT CHANGE UP
SPECIMEN SOURCE: SIGNIFICANT CHANGE UP

## 2025-07-17 PROCEDURE — 99214 OFFICE O/P EST MOD 30 MIN: CPT

## 2025-07-17 PROCEDURE — G0463: CPT

## 2025-07-21 ENCOUNTER — OUTPATIENT (OUTPATIENT)
Dept: OUTPATIENT SERVICES | Facility: HOSPITAL | Age: 65
LOS: 1 days | End: 2025-07-21
Payer: MEDICARE

## 2025-07-21 ENCOUNTER — APPOINTMENT (OUTPATIENT)
Dept: WOUND CARE | Facility: HOSPITAL | Age: 65
End: 2025-07-21
Payer: MEDICARE

## 2025-07-21 VITALS
WEIGHT: 274 LBS | SYSTOLIC BLOOD PRESSURE: 103 MMHG | DIASTOLIC BLOOD PRESSURE: 67 MMHG | TEMPERATURE: 98.3 F | HEIGHT: 77 IN | BODY MASS INDEX: 32.35 KG/M2 | RESPIRATION RATE: 18 BRPM | OXYGEN SATURATION: 95 % | HEART RATE: 56 BPM
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
## 2025-07-21 DIAGNOSIS — E11.621 TYPE 2 DIABETES MELLITUS WITH FOOT ULCER: ICD-10-CM

## 2025-07-21 DIAGNOSIS — T87.89 OTHER COMPLICATIONS OF AMPUTATION STUMP: ICD-10-CM

## 2025-07-21 DIAGNOSIS — Z89.411 ACQUIRED ABSENCE OF RIGHT GREAT TOE: ICD-10-CM
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
## 2025-07-21 DIAGNOSIS — E11.69 TYPE 2 DIABETES MELLITUS WITH OTHER SPECIFIED COMPLICATION: ICD-10-CM

## 2025-07-21 PROCEDURE — ZZZZZ: CPT

## 2025-07-21 PROCEDURE — G0463: CPT

## 2025-07-24 ENCOUNTER — APPOINTMENT (OUTPATIENT)
Dept: WOUND CARE | Facility: HOSPITAL | Age: 65
End: 2025-07-24
Payer: MEDICARE

## 2025-07-24 ENCOUNTER — OUTPATIENT (OUTPATIENT)
Dept: OUTPATIENT SERVICES | Facility: HOSPITAL | Age: 65
LOS: 1 days | End: 2025-07-24
Payer: MEDICARE

## 2025-07-24 VITALS
BODY MASS INDEX: 32.47 KG/M2 | SYSTOLIC BLOOD PRESSURE: 122 MMHG | OXYGEN SATURATION: 90 % | HEIGHT: 77 IN | DIASTOLIC BLOOD PRESSURE: 71 MMHG | HEART RATE: 55 BPM | TEMPERATURE: 98.1 F | RESPIRATION RATE: 18 BRPM | WEIGHT: 275 LBS

## 2025-07-24 DIAGNOSIS — E11.621 TYPE 2 DIABETES MELLITUS WITH FOOT ULCER: ICD-10-CM

## 2025-07-24 DIAGNOSIS — T87.89 OTHER COMPLICATIONS OF AMPUTATION STUMP: ICD-10-CM

## 2025-07-24 DIAGNOSIS — M86.9 OSTEOMYELITIS, UNSPECIFIED: ICD-10-CM

## 2025-07-24 PROCEDURE — 99214 OFFICE O/P EST MOD 30 MIN: CPT

## 2025-07-24 PROCEDURE — G0463: CPT

## 2025-07-27 DIAGNOSIS — T87.89 OTHER COMPLICATIONS OF AMPUTATION STUMP: ICD-10-CM

## 2025-07-27 DIAGNOSIS — M86.9 OSTEOMYELITIS, UNSPECIFIED: ICD-10-CM

## 2025-07-27 DIAGNOSIS — Z83.438 FAMILY HISTORY OF OTHER DISORDER OF LIPOPROTEIN METABOLISM AND OTHER LIPIDEMIA: ICD-10-CM

## 2025-07-27 DIAGNOSIS — Z80.8 FAMILY HISTORY OF MALIGNANT NEOPLASM OF OTHER ORGANS OR SYSTEMS: ICD-10-CM

## 2025-07-27 DIAGNOSIS — Z79.84 LONG TERM (CURRENT) USE OF ORAL HYPOGLYCEMIC DRUGS: ICD-10-CM

## 2025-07-27 DIAGNOSIS — Z83.3 FAMILY HISTORY OF DIABETES MELLITUS: ICD-10-CM

## 2025-07-27 DIAGNOSIS — I10 ESSENTIAL (PRIMARY) HYPERTENSION: ICD-10-CM

## 2025-07-27 DIAGNOSIS — E11.69 TYPE 2 DIABETES MELLITUS WITH OTHER SPECIFIED COMPLICATION: ICD-10-CM

## 2025-07-27 DIAGNOSIS — Z79.899 OTHER LONG TERM (CURRENT) DRUG THERAPY: ICD-10-CM
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
## 2025-07-27 DIAGNOSIS — Z89.411 ACQUIRED ABSENCE OF RIGHT GREAT TOE: ICD-10-CM

## 2025-07-27 DIAGNOSIS — Z79.82 LONG TERM (CURRENT) USE OF ASPIRIN: ICD-10-CM
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
## 2025-07-27 DIAGNOSIS — Z87.891 PERSONAL HISTORY OF NICOTINE DEPENDENCE: ICD-10-CM

## 2025-07-27 DIAGNOSIS — E78.5 HYPERLIPIDEMIA, UNSPECIFIED: ICD-10-CM

## 2025-07-29 ENCOUNTER — APPOINTMENT (OUTPATIENT)
Dept: WOUND CARE | Facility: HOSPITAL | Age: 65
End: 2025-07-29
Payer: MEDICARE

## 2025-07-29 ENCOUNTER — OUTPATIENT (OUTPATIENT)
Dept: OUTPATIENT SERVICES | Facility: HOSPITAL | Age: 65
LOS: 1 days | End: 2025-07-29
Payer: MEDICARE

## 2025-07-29 VITALS
WEIGHT: 275 LBS | BODY MASS INDEX: 32.47 KG/M2 | SYSTOLIC BLOOD PRESSURE: 124 MMHG | HEART RATE: 54 BPM | HEIGHT: 77 IN | RESPIRATION RATE: 18 BRPM | OXYGEN SATURATION: 90 % | TEMPERATURE: 97.9 F | DIASTOLIC BLOOD PRESSURE: 74 MMHG

## 2025-07-29 DIAGNOSIS — E78.5 HYPERLIPIDEMIA, UNSPECIFIED: ICD-10-CM

## 2025-07-29 DIAGNOSIS — Z87.891 PERSONAL HISTORY OF NICOTINE DEPENDENCE: ICD-10-CM

## 2025-07-29 DIAGNOSIS — T87.89 OTHER COMPLICATIONS OF AMPUTATION STUMP: ICD-10-CM
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
## 2025-07-29 DIAGNOSIS — E11.621 TYPE 2 DIABETES MELLITUS WITH FOOT ULCER: ICD-10-CM

## 2025-07-29 DIAGNOSIS — I10 ESSENTIAL (PRIMARY) HYPERTENSION: ICD-10-CM

## 2025-07-29 DIAGNOSIS — Z83.438 FAMILY HISTORY OF OTHER DISORDER OF LIPOPROTEIN METABOLISM AND OTHER LIPIDEMIA: ICD-10-CM

## 2025-07-29 DIAGNOSIS — E11.69 TYPE 2 DIABETES MELLITUS WITH OTHER SPECIFIED COMPLICATION: ICD-10-CM

## 2025-07-29 DIAGNOSIS — Z79.82 LONG TERM (CURRENT) USE OF ASPIRIN: ICD-10-CM

## 2025-07-29 DIAGNOSIS — Z83.3 FAMILY HISTORY OF DIABETES MELLITUS: ICD-10-CM

## 2025-07-29 DIAGNOSIS — Z80.8 FAMILY HISTORY OF MALIGNANT NEOPLASM OF OTHER ORGANS OR SYSTEMS: ICD-10-CM

## 2025-07-29 DIAGNOSIS — Z89.411 ACQUIRED ABSENCE OF RIGHT GREAT TOE: ICD-10-CM

## 2025-07-29 DIAGNOSIS — Z79.84 LONG TERM (CURRENT) USE OF ORAL HYPOGLYCEMIC DRUGS: ICD-10-CM

## 2025-07-29 DIAGNOSIS — Z79.899 OTHER LONG TERM (CURRENT) DRUG THERAPY: ICD-10-CM
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
## 2025-07-29 PROCEDURE — G0463: CPT

## 2025-07-29 PROCEDURE — 99213 OFFICE O/P EST LOW 20 MIN: CPT

## 2025-08-05 ENCOUNTER — APPOINTMENT (OUTPATIENT)
Dept: WOUND CARE | Facility: HOSPITAL | Age: 65
End: 2025-08-05
Payer: MEDICARE

## 2025-08-05 ENCOUNTER — OUTPATIENT (OUTPATIENT)
Dept: OUTPATIENT SERVICES | Facility: HOSPITAL | Age: 65
LOS: 1 days | End: 2025-08-05
Payer: MEDICARE

## 2025-08-05 VITALS
BODY MASS INDEX: 32.47 KG/M2 | SYSTOLIC BLOOD PRESSURE: 102 MMHG | TEMPERATURE: 99 F | DIASTOLIC BLOOD PRESSURE: 60 MMHG | RESPIRATION RATE: 18 BRPM | WEIGHT: 275 LBS | HEIGHT: 77 IN | HEART RATE: 47 BPM | OXYGEN SATURATION: 90 %

## 2025-08-05 DIAGNOSIS — E11.621 TYPE 2 DIABETES MELLITUS WITH FOOT ULCER: ICD-10-CM

## 2025-08-05 DIAGNOSIS — E88.9 TYPE 2 DIABETES MELLITUS WITH OTHER SPECIFIED COMPLICATION: ICD-10-CM

## 2025-08-05 DIAGNOSIS — T87.81 DEHISCENCE OF AMPUTATION STUMP: ICD-10-CM

## 2025-08-05 DIAGNOSIS — E11.69 TYPE 2 DIABETES MELLITUS WITH OTHER SPECIFIED COMPLICATION: ICD-10-CM

## 2025-08-05 DIAGNOSIS — M86.9 OSTEOMYELITIS, UNSPECIFIED: ICD-10-CM

## 2025-08-05 DIAGNOSIS — Z89.411 ACQUIRED ABSENCE OF RIGHT GREAT TOE: ICD-10-CM

## 2025-08-05 PROCEDURE — G0463: CPT

## 2025-08-05 PROCEDURE — 99213 OFFICE O/P EST LOW 20 MIN: CPT

## 2025-08-06 DIAGNOSIS — Z83.438 FAMILY HISTORY OF OTHER DISORDER OF LIPOPROTEIN METABOLISM AND OTHER LIPIDEMIA: ICD-10-CM

## 2025-08-06 DIAGNOSIS — Z80.8 FAMILY HISTORY OF MALIGNANT NEOPLASM OF OTHER ORGANS OR SYSTEMS: ICD-10-CM
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
## 2025-08-06 DIAGNOSIS — T87.89 OTHER COMPLICATIONS OF AMPUTATION STUMP: ICD-10-CM

## 2025-08-06 DIAGNOSIS — E11.69 TYPE 2 DIABETES MELLITUS WITH OTHER SPECIFIED COMPLICATION: ICD-10-CM

## 2025-08-06 DIAGNOSIS — Z83.3 FAMILY HISTORY OF DIABETES MELLITUS: ICD-10-CM

## 2025-08-06 DIAGNOSIS — Z79.82 LONG TERM (CURRENT) USE OF ASPIRIN: ICD-10-CM

## 2025-08-06 DIAGNOSIS — I10 ESSENTIAL (PRIMARY) HYPERTENSION: ICD-10-CM

## 2025-08-06 DIAGNOSIS — M86.9 OSTEOMYELITIS, UNSPECIFIED: ICD-10-CM
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
## 2025-08-06 DIAGNOSIS — Z87.891 PERSONAL HISTORY OF NICOTINE DEPENDENCE: ICD-10-CM

## 2025-08-06 DIAGNOSIS — Z79.84 LONG TERM (CURRENT) USE OF ORAL HYPOGLYCEMIC DRUGS: ICD-10-CM

## 2025-08-06 DIAGNOSIS — Z89.411 ACQUIRED ABSENCE OF RIGHT GREAT TOE: ICD-10-CM

## 2025-08-06 DIAGNOSIS — E78.5 HYPERLIPIDEMIA, UNSPECIFIED: ICD-10-CM

## 2025-08-06 DIAGNOSIS — Z84.1 FAMILY HISTORY OF DISORDERS OF KIDNEY AND URETER: ICD-10-CM

## 2025-08-18 ENCOUNTER — APPOINTMENT (OUTPATIENT)
Dept: WOUND CARE | Facility: HOSPITAL | Age: 65
End: 2025-08-18
Payer: MEDICARE

## 2025-08-18 ENCOUNTER — OUTPATIENT (OUTPATIENT)
Dept: OUTPATIENT SERVICES | Facility: HOSPITAL | Age: 65
LOS: 1 days | End: 2025-08-18
Payer: MEDICARE

## 2025-08-18 VITALS
HEART RATE: 57 BPM | RESPIRATION RATE: 16 BRPM | SYSTOLIC BLOOD PRESSURE: 131 MMHG | OXYGEN SATURATION: 95 % | TEMPERATURE: 97.8 F | BODY MASS INDEX: 32.47 KG/M2 | WEIGHT: 275 LBS | HEIGHT: 77 IN | DIASTOLIC BLOOD PRESSURE: 84 MMHG

## 2025-08-18 DIAGNOSIS — S91.301A UNSPECIFIED OPEN WOUND, RIGHT FOOT, INITIAL ENCOUNTER: ICD-10-CM

## 2025-08-18 DIAGNOSIS — T87.89 OTHER COMPLICATIONS OF AMPUTATION STUMP: ICD-10-CM

## 2025-08-18 PROCEDURE — 99204 OFFICE O/P NEW MOD 45 MIN: CPT

## 2025-08-18 PROCEDURE — G0463: CPT

## 2025-08-18 RX ORDER — CIPROFLOXACIN HYDROCHLORIDE 500 MG/1
500 TABLET, FILM COATED ORAL
Qty: 20 | Refills: 0 | Status: ACTIVE | COMMUNITY
Start: 2025-08-18 | End: 1900-01-01

## 2025-08-25 ENCOUNTER — APPOINTMENT (OUTPATIENT)
Dept: WOUND CARE | Facility: HOSPITAL | Age: 65
End: 2025-08-25
Payer: MEDICARE

## 2025-08-25 ENCOUNTER — OUTPATIENT (OUTPATIENT)
Dept: OUTPATIENT SERVICES | Facility: HOSPITAL | Age: 65
LOS: 1 days | End: 2025-08-25
Payer: MEDICARE

## 2025-08-25 VITALS
WEIGHT: 275 LBS | HEIGHT: 77 IN | TEMPERATURE: 98.3 F | BODY MASS INDEX: 32.47 KG/M2 | RESPIRATION RATE: 16 BRPM | HEART RATE: 55 BPM | SYSTOLIC BLOOD PRESSURE: 136 MMHG | DIASTOLIC BLOOD PRESSURE: 75 MMHG | OXYGEN SATURATION: 96 %

## 2025-08-25 DIAGNOSIS — Z89.411 ACQUIRED ABSENCE OF RIGHT GREAT TOE: ICD-10-CM

## 2025-08-25 DIAGNOSIS — T87.89 OTHER COMPLICATIONS OF AMPUTATION STUMP: ICD-10-CM
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
## 2025-08-25 DIAGNOSIS — S91.104A UNSPECIFIED OPEN WOUND OF RIGHT LESSER TOE(S) WITHOUT DAMAGE TO NAIL, INITIAL ENCOUNTER: ICD-10-CM

## 2025-08-25 DIAGNOSIS — Z83.438 FAMILY HISTORY OF OTHER DISORDER OF LIPOPROTEIN METABOLISM AND OTHER LIPIDEMIA: ICD-10-CM
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
## 2025-08-25 DIAGNOSIS — Z83.3 FAMILY HISTORY OF DIABETES MELLITUS: ICD-10-CM

## 2025-08-25 DIAGNOSIS — E78.5 HYPERLIPIDEMIA, UNSPECIFIED: ICD-10-CM

## 2025-08-25 DIAGNOSIS — Z79.84 LONG TERM (CURRENT) USE OF ORAL HYPOGLYCEMIC DRUGS: ICD-10-CM

## 2025-08-25 DIAGNOSIS — Z87.891 PERSONAL HISTORY OF NICOTINE DEPENDENCE: ICD-10-CM

## 2025-08-25 DIAGNOSIS — I10 ESSENTIAL (PRIMARY) HYPERTENSION: ICD-10-CM
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
## 2025-08-25 DIAGNOSIS — Z80.8 FAMILY HISTORY OF MALIGNANT NEOPLASM OF OTHER ORGANS OR SYSTEMS: ICD-10-CM
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
## 2025-08-25 DIAGNOSIS — Z79.899 OTHER LONG TERM (CURRENT) DRUG THERAPY: ICD-10-CM

## 2025-08-25 DIAGNOSIS — Z79.82 LONG TERM (CURRENT) USE OF ASPIRIN: ICD-10-CM

## 2025-08-25 PROCEDURE — 99215 OFFICE O/P EST HI 40 MIN: CPT

## 2025-08-25 PROCEDURE — G0463: CPT

## 2025-08-26 PROBLEM — M20.41 ACQUIRED HAMMERTOE OF RIGHT FOOT: Status: ACTIVE | Noted: 2025-08-26

## 2025-08-26 PROBLEM — M20.42 ACQUIRED HAMMERTOE OF LEFT FOOT: Status: ACTIVE | Noted: 2025-08-26

## 2025-08-27 DIAGNOSIS — I10 ESSENTIAL (PRIMARY) HYPERTENSION: ICD-10-CM

## 2025-08-27 DIAGNOSIS — Z79.84 LONG TERM (CURRENT) USE OF ORAL HYPOGLYCEMIC DRUGS: ICD-10-CM

## 2025-08-27 DIAGNOSIS — Z80.8 FAMILY HISTORY OF MALIGNANT NEOPLASM OF OTHER ORGANS OR SYSTEMS: ICD-10-CM

## 2025-08-27 DIAGNOSIS — E11.621 TYPE 2 DIABETES MELLITUS WITH FOOT ULCER: ICD-10-CM

## 2025-08-27 DIAGNOSIS — Z79.899 OTHER LONG TERM (CURRENT) DRUG THERAPY: ICD-10-CM

## 2025-08-27 DIAGNOSIS — Z89.411 ACQUIRED ABSENCE OF RIGHT GREAT TOE: ICD-10-CM

## 2025-08-27 DIAGNOSIS — Z83.438 FAMILY HISTORY OF OTHER DISORDER OF LIPOPROTEIN METABOLISM AND OTHER LIPIDEMIA: ICD-10-CM

## 2025-08-27 DIAGNOSIS — M20.41 OTHER HAMMER TOE(S) (ACQUIRED), RIGHT FOOT: ICD-10-CM

## 2025-08-27 DIAGNOSIS — Z87.891 PERSONAL HISTORY OF NICOTINE DEPENDENCE: ICD-10-CM

## 2025-08-27 DIAGNOSIS — Z83.3 FAMILY HISTORY OF DIABETES MELLITUS: ICD-10-CM

## 2025-08-27 DIAGNOSIS — E78.5 HYPERLIPIDEMIA, UNSPECIFIED: ICD-10-CM

## 2025-08-27 DIAGNOSIS — M20.42 OTHER HAMMER TOE(S) (ACQUIRED), LEFT FOOT: ICD-10-CM

## 2025-08-27 DIAGNOSIS — Z84.1 FAMILY HISTORY OF DISORDERS OF KIDNEY AND URETER: ICD-10-CM

## 2025-08-27 DIAGNOSIS — Z79.82 LONG TERM (CURRENT) USE OF ASPIRIN: ICD-10-CM

## 2025-08-27 DIAGNOSIS — L97.512 NON-PRESSURE CHRONIC ULCER OF OTHER PART OF RIGHT FOOT WITH FAT LAYER EXPOSED: ICD-10-CM

## 2025-08-29 ENCOUNTER — APPOINTMENT (OUTPATIENT)
Age: 65
End: 2025-08-29
Payer: MEDICARE

## 2025-08-29 VITALS
BODY MASS INDEX: 32.23 KG/M2 | TEMPERATURE: 97.2 F | HEIGHT: 77 IN | DIASTOLIC BLOOD PRESSURE: 80 MMHG | OXYGEN SATURATION: 97 % | WEIGHT: 273 LBS | HEART RATE: 69 BPM | SYSTOLIC BLOOD PRESSURE: 126 MMHG

## 2025-08-29 DIAGNOSIS — Z12.5 ENCOUNTER FOR SCREENING FOR MALIGNANT NEOPLASM OF PROSTATE: ICD-10-CM

## 2025-08-29 DIAGNOSIS — Z79.899 OTHER LONG TERM (CURRENT) DRUG THERAPY: ICD-10-CM

## 2025-08-29 DIAGNOSIS — E78.5 HYPERLIPIDEMIA, UNSPECIFIED: ICD-10-CM

## 2025-08-29 DIAGNOSIS — E88.810 METABOLIC SYNDROME: ICD-10-CM

## 2025-08-29 DIAGNOSIS — E55.9 VITAMIN D DEFICIENCY, UNSPECIFIED: ICD-10-CM

## 2025-08-29 DIAGNOSIS — Z12.11 ENCOUNTER FOR SCREENING FOR MALIGNANT NEOPLASM OF COLON: ICD-10-CM

## 2025-08-29 DIAGNOSIS — I10 ESSENTIAL (PRIMARY) HYPERTENSION: ICD-10-CM

## 2025-08-29 DIAGNOSIS — E11.8 TYPE 2 DIABETES MELLITUS WITH UNSPECIFIED COMPLICATIONS: ICD-10-CM

## 2025-08-29 LAB
25(OH)D3 SERPL-MCNC: 76.9 NG/ML
ALBUMIN SERPL ELPH-MCNC: 4.9 G/DL
ALP BLD-CCNC: 73 U/L
ALT SERPL-CCNC: 36 U/L
ANION GAP SERPL CALC-SCNC: 14 MMOL/L
AST SERPL-CCNC: 26 U/L
BASOPHILS # BLD AUTO: 0.05 K/UL
BASOPHILS NFR BLD AUTO: 0.5 %
BILIRUB SERPL-MCNC: 0.5 MG/DL
BUN SERPL-MCNC: 18 MG/DL
CALCIUM SERPL-MCNC: 10.1 MG/DL
CHLORIDE SERPL-SCNC: 98 MMOL/L
CHOLEST SERPL-MCNC: 152 MG/DL
CO2 SERPL-SCNC: 28 MMOL/L
CREAT SERPL-MCNC: 0.8 MG/DL
EGFRCR SERPLBLD CKD-EPI 2021: 98 ML/MIN/1.73M2
EOSINOPHIL # BLD AUTO: 0.76 K/UL
EOSINOPHIL NFR BLD AUTO: 7.3 %
ESTIMATED AVERAGE GLUCOSE: 174 MG/DL
GLUCOSE SERPL-MCNC: 191 MG/DL
HBA1C MFR BLD HPLC: 7.7 %
HCT VFR BLD CALC: 51.1 %
HDLC SERPL-MCNC: 48 MG/DL
HGB BLD-MCNC: 16.4 G/DL
IMM GRANULOCYTES NFR BLD AUTO: 0.3 %
LDLC SERPL-MCNC: 76 MG/DL
LYMPHOCYTES # BLD AUTO: 3.06 K/UL
LYMPHOCYTES NFR BLD AUTO: 29.5 %
MAN DIFF?: NORMAL
MCHC RBC-ENTMCNC: 31.2 PG
MCHC RBC-ENTMCNC: 32.1 G/DL
MCV RBC AUTO: 97.3 FL
MONOCYTES # BLD AUTO: 0.83 K/UL
MONOCYTES NFR BLD AUTO: 8 %
NEUTROPHILS # BLD AUTO: 5.63 K/UL
NEUTROPHILS NFR BLD AUTO: 54.4 %
NONHDLC SERPL-MCNC: 103 MG/DL
PLATELET # BLD AUTO: 303 K/UL
POTASSIUM SERPL-SCNC: 5.2 MMOL/L
PROT SERPL-MCNC: 7.3 G/DL
PSA SERPL-MCNC: 0.68 NG/ML
RBC # BLD: 5.25 M/UL
RBC # FLD: 12.9 %
SODIUM SERPL-SCNC: 139 MMOL/L
TRIGL SERPL-MCNC: 154 MG/DL
WBC # FLD AUTO: 10.36 K/UL

## 2025-08-29 PROCEDURE — 99214 OFFICE O/P EST MOD 30 MIN: CPT

## 2025-08-29 PROCEDURE — 36415 COLL VENOUS BLD VENIPUNCTURE: CPT

## 2025-08-29 PROCEDURE — G2211 COMPLEX E/M VISIT ADD ON: CPT

## 2025-08-29 RX ORDER — CIPROFLOXACIN HYDROCHLORIDE 500 MG/1
500 TABLET, FILM COATED ORAL TWICE DAILY
Qty: 20 | Refills: 0 | Status: DISCONTINUED | COMMUNITY
Start: 2025-08-25 | End: 2025-08-29

## 2025-08-30 LAB
ALBUMIN, RANDOM URINE: <1.2 MG/DL
CREAT SPEC-SCNC: 109 MG/DL
MICROALBUMIN/CREAT 24H UR-RTO: NORMAL MG/G

## 2025-09-02 ENCOUNTER — RX RENEWAL (OUTPATIENT)
Age: 65
End: 2025-09-02

## 2025-09-04 ENCOUNTER — APPOINTMENT (OUTPATIENT)
Dept: WOUND CARE | Facility: HOSPITAL | Age: 65
End: 2025-09-04

## 2025-09-04 ENCOUNTER — OUTPATIENT (OUTPATIENT)
Dept: OUTPATIENT SERVICES | Facility: HOSPITAL | Age: 65
LOS: 1 days | End: 2025-09-04
Payer: MEDICARE

## 2025-09-04 VITALS
HEART RATE: 87 BPM | WEIGHT: 273 LBS | SYSTOLIC BLOOD PRESSURE: 128 MMHG | RESPIRATION RATE: 18 BRPM | TEMPERATURE: 98.9 F | DIASTOLIC BLOOD PRESSURE: 76 MMHG | BODY MASS INDEX: 32.23 KG/M2 | OXYGEN SATURATION: 95 % | HEIGHT: 77 IN

## 2025-09-04 DIAGNOSIS — Z79.82 LONG TERM (CURRENT) USE OF ASPIRIN: ICD-10-CM

## 2025-09-04 DIAGNOSIS — E78.5 HYPERLIPIDEMIA, UNSPECIFIED: ICD-10-CM

## 2025-09-04 DIAGNOSIS — Z79.899 OTHER LONG TERM (CURRENT) DRUG THERAPY: ICD-10-CM

## 2025-09-04 DIAGNOSIS — E11.621 TYPE 2 DIABETES MELLITUS WITH FOOT ULCER: ICD-10-CM

## 2025-09-04 DIAGNOSIS — M20.42 OTHER HAMMER TOE(S) (ACQUIRED), LEFT FOOT: ICD-10-CM

## 2025-09-04 DIAGNOSIS — Z89.411 ACQUIRED ABSENCE OF RIGHT GREAT TOE: ICD-10-CM

## 2025-09-04 DIAGNOSIS — Z79.84 LONG TERM (CURRENT) USE OF ORAL HYPOGLYCEMIC DRUGS: ICD-10-CM

## 2025-09-04 DIAGNOSIS — T87.89 OTHER COMPLICATIONS OF AMPUTATION STUMP: ICD-10-CM

## 2025-09-04 DIAGNOSIS — L97.512 NON-PRESSURE CHRONIC ULCER OF OTHER PART OF RIGHT FOOT WITH FAT LAYER EXPOSED: ICD-10-CM

## 2025-09-04 DIAGNOSIS — M20.41 OTHER HAMMER TOE(S) (ACQUIRED), RIGHT FOOT: ICD-10-CM

## 2025-09-04 DIAGNOSIS — I10 ESSENTIAL (PRIMARY) HYPERTENSION: ICD-10-CM

## 2025-09-04 DIAGNOSIS — Z87.891 PERSONAL HISTORY OF NICOTINE DEPENDENCE: ICD-10-CM

## 2025-09-04 PROCEDURE — 28232 INCISION OF TOE TENDON: CPT | Mod: T9

## 2025-09-04 PROCEDURE — 28232 INCISION OF TOE TENDON: CPT | Mod: T7

## 2025-09-11 ENCOUNTER — APPOINTMENT (OUTPATIENT)
Dept: WOUND CARE | Facility: HOSPITAL | Age: 65
End: 2025-09-11
Payer: MEDICARE

## 2025-09-11 VITALS
WEIGHT: 273 LBS | SYSTOLIC BLOOD PRESSURE: 152 MMHG | RESPIRATION RATE: 18 BRPM | OXYGEN SATURATION: 95 % | HEIGHT: 77 IN | DIASTOLIC BLOOD PRESSURE: 73 MMHG | BODY MASS INDEX: 32.23 KG/M2 | HEART RATE: 82 BPM | TEMPERATURE: 98.1 F

## 2025-09-11 DIAGNOSIS — L97.509 TYPE 2 DIABETES MELLITUS WITH FOOT ULCER: ICD-10-CM

## 2025-09-11 DIAGNOSIS — L97.512 NON-PRESSURE CHRONIC ULCER OF OTHER PART OF RIGHT FOOT WITH FAT LAYER EXPOSED: ICD-10-CM

## 2025-09-11 DIAGNOSIS — M20.42 OTHER HAMMER TOE(S) (ACQUIRED), LEFT FOOT: ICD-10-CM

## 2025-09-11 DIAGNOSIS — M20.41 OTHER HAMMER TOE(S) (ACQUIRED), RIGHT FOOT: ICD-10-CM

## 2025-09-11 DIAGNOSIS — E11.621 TYPE 2 DIABETES MELLITUS WITH FOOT ULCER: ICD-10-CM

## 2025-09-11 PROCEDURE — 99024 POSTOP FOLLOW-UP VISIT: CPT
